# Patient Record
Sex: FEMALE | Race: WHITE | NOT HISPANIC OR LATINO | URBAN - METROPOLITAN AREA
[De-identification: names, ages, dates, MRNs, and addresses within clinical notes are randomized per-mention and may not be internally consistent; named-entity substitution may affect disease eponyms.]

---

## 2024-05-08 ENCOUNTER — INPATIENT (INPATIENT)
Facility: HOSPITAL | Age: 89
LOS: 4 days | Discharge: NURSING, REHAB OR LONG-TERM CARE | DRG: 280 | End: 2024-05-13
Attending: STUDENT IN AN ORGANIZED HEALTH CARE EDUCATION/TRAINING PROGRAM | Admitting: STUDENT IN AN ORGANIZED HEALTH CARE EDUCATION/TRAINING PROGRAM
Payer: MEDICARE

## 2024-05-08 VITALS
HEIGHT: 66 IN | SYSTOLIC BLOOD PRESSURE: 232 MMHG | OXYGEN SATURATION: 93 % | DIASTOLIC BLOOD PRESSURE: 125 MMHG | WEIGHT: 119.05 LBS | HEART RATE: 81 BPM | TEMPERATURE: 97 F | RESPIRATION RATE: 25 BRPM

## 2024-05-08 DIAGNOSIS — I16.1 HYPERTENSIVE EMERGENCY: ICD-10-CM

## 2024-05-08 LAB
ALBUMIN SERPL ELPH-MCNC: 3.1 G/DL — LOW (ref 3.3–5)
ALP SERPL-CCNC: 89 U/L — SIGNIFICANT CHANGE UP (ref 40–120)
ALT FLD-CCNC: 77 U/L — HIGH (ref 10–45)
ANION GAP SERPL CALC-SCNC: 12 MMOL/L — SIGNIFICANT CHANGE UP (ref 5–17)
AST SERPL-CCNC: 65 U/L — HIGH (ref 10–40)
BASOPHILS # BLD AUTO: 0.07 K/UL — SIGNIFICANT CHANGE UP (ref 0–0.2)
BASOPHILS NFR BLD AUTO: 0.4 % — SIGNIFICANT CHANGE UP (ref 0–2)
BILIRUB SERPL-MCNC: 0.4 MG/DL — SIGNIFICANT CHANGE UP (ref 0.2–1.2)
BUN SERPL-MCNC: 74 MG/DL — HIGH (ref 7–23)
CALCIUM SERPL-MCNC: 8.6 MG/DL — SIGNIFICANT CHANGE UP (ref 8.4–10.5)
CHLORIDE SERPL-SCNC: 101 MMOL/L — SIGNIFICANT CHANGE UP (ref 96–108)
CO2 SERPL-SCNC: 27 MMOL/L — SIGNIFICANT CHANGE UP (ref 22–31)
CREAT SERPL-MCNC: 3.46 MG/DL — HIGH (ref 0.5–1.3)
EGFR: 12 ML/MIN/1.73M2 — LOW
EOSINOPHIL # BLD AUTO: 0.2 K/UL — SIGNIFICANT CHANGE UP (ref 0–0.5)
EOSINOPHIL NFR BLD AUTO: 1.1 % — SIGNIFICANT CHANGE UP (ref 0–6)
GLUCOSE SERPL-MCNC: 172 MG/DL — HIGH (ref 70–99)
HCT VFR BLD CALC: 42.3 % — SIGNIFICANT CHANGE UP (ref 34.5–45)
HGB BLD-MCNC: 13.6 G/DL — SIGNIFICANT CHANGE UP (ref 11.5–15.5)
IMM GRANULOCYTES NFR BLD AUTO: 0.6 % — SIGNIFICANT CHANGE UP (ref 0–0.9)
LYMPHOCYTES # BLD AUTO: 1.12 K/UL — SIGNIFICANT CHANGE UP (ref 1–3.3)
LYMPHOCYTES # BLD AUTO: 6.1 % — LOW (ref 13–44)
MCHC RBC-ENTMCNC: 30.4 PG — SIGNIFICANT CHANGE UP (ref 27–34)
MCHC RBC-ENTMCNC: 32.2 GM/DL — SIGNIFICANT CHANGE UP (ref 32–36)
MCV RBC AUTO: 94.4 FL — SIGNIFICANT CHANGE UP (ref 80–100)
MONOCYTES # BLD AUTO: 0.64 K/UL — SIGNIFICANT CHANGE UP (ref 0–0.9)
MONOCYTES NFR BLD AUTO: 3.5 % — SIGNIFICANT CHANGE UP (ref 2–14)
NEUTROPHILS # BLD AUTO: 16.23 K/UL — HIGH (ref 1.8–7.4)
NEUTROPHILS NFR BLD AUTO: 88.3 % — HIGH (ref 43–77)
NRBC # BLD: 0 /100 WBCS — SIGNIFICANT CHANGE UP (ref 0–0)
NT-PROBNP SERPL-SCNC: 8464 PG/ML — HIGH (ref 0–300)
PLATELET # BLD AUTO: 240 K/UL — SIGNIFICANT CHANGE UP (ref 150–400)
POTASSIUM SERPL-MCNC: 4.2 MMOL/L — SIGNIFICANT CHANGE UP (ref 3.5–5.3)
POTASSIUM SERPL-SCNC: 4.2 MMOL/L — SIGNIFICANT CHANGE UP (ref 3.5–5.3)
PROT SERPL-MCNC: 7.1 G/DL — SIGNIFICANT CHANGE UP (ref 6–8.3)
RAPID RVP RESULT: SIGNIFICANT CHANGE UP
RBC # BLD: 4.48 M/UL — SIGNIFICANT CHANGE UP (ref 3.8–5.2)
RBC # FLD: 15.2 % — HIGH (ref 10.3–14.5)
SARS-COV-2 RNA SPEC QL NAA+PROBE: SIGNIFICANT CHANGE UP
SODIUM SERPL-SCNC: 140 MMOL/L — SIGNIFICANT CHANGE UP (ref 135–145)
TROPONIN I, HIGH SENSITIVITY RESULT: 1237.6 NG/L — HIGH
TROPONIN I, HIGH SENSITIVITY RESULT: 483 NG/L — HIGH
TROPONIN I, HIGH SENSITIVITY RESULT: 68.2 NG/L — HIGH
WBC # BLD: 18.37 K/UL — HIGH (ref 3.8–10.5)
WBC # FLD AUTO: 18.37 K/UL — HIGH (ref 3.8–10.5)

## 2024-05-08 PROCEDURE — 99223 1ST HOSP IP/OBS HIGH 75: CPT

## 2024-05-08 PROCEDURE — 93306 TTE W/DOPPLER COMPLETE: CPT | Mod: 26

## 2024-05-08 PROCEDURE — 70450 CT HEAD/BRAIN W/O DYE: CPT | Mod: 26,MC

## 2024-05-08 PROCEDURE — 71045 X-RAY EXAM CHEST 1 VIEW: CPT | Mod: 26

## 2024-05-08 PROCEDURE — 99291 CRITICAL CARE FIRST HOUR: CPT

## 2024-05-08 PROCEDURE — 99222 1ST HOSP IP/OBS MODERATE 55: CPT

## 2024-05-08 PROCEDURE — 93010 ELECTROCARDIOGRAM REPORT: CPT

## 2024-05-08 RX ORDER — FERROUS FUMARATE 350(115)MG
1 TABLET ORAL
Refills: 0 | DISCHARGE

## 2024-05-08 RX ORDER — HEPARIN SODIUM 5000 [USP'U]/ML
5000 INJECTION INTRAVENOUS; SUBCUTANEOUS EVERY 8 HOURS
Refills: 0 | Status: DISCONTINUED | OUTPATIENT
Start: 2024-05-08 | End: 2024-05-09

## 2024-05-08 RX ORDER — NIFEDIPINE 30 MG
1 TABLET, EXTENDED RELEASE 24 HR ORAL
Refills: 0 | DISCHARGE

## 2024-05-08 RX ORDER — CARVEDILOL PHOSPHATE 80 MG/1
25 CAPSULE, EXTENDED RELEASE ORAL EVERY 12 HOURS
Refills: 0 | Status: DISCONTINUED | OUTPATIENT
Start: 2024-05-08 | End: 2024-05-08

## 2024-05-08 RX ORDER — FUROSEMIDE 40 MG
40 TABLET ORAL ONCE
Refills: 0 | Status: COMPLETED | OUTPATIENT
Start: 2024-05-08 | End: 2024-05-08

## 2024-05-08 RX ORDER — SERTRALINE 25 MG/1
1 TABLET, FILM COATED ORAL
Refills: 0 | DISCHARGE

## 2024-05-08 RX ORDER — HYDRALAZINE HCL 50 MG
50 TABLET ORAL EVERY 8 HOURS
Refills: 0 | Status: DISCONTINUED | OUTPATIENT
Start: 2024-05-08 | End: 2024-05-09

## 2024-05-08 RX ORDER — NIFEDIPINE 30 MG
60 TABLET, EXTENDED RELEASE 24 HR ORAL DAILY
Refills: 0 | Status: DISCONTINUED | OUTPATIENT
Start: 2024-05-08 | End: 2024-05-13

## 2024-05-08 RX ORDER — SENNA PLUS 8.6 MG/1
1 TABLET ORAL AT BEDTIME
Refills: 0 | Status: DISCONTINUED | OUTPATIENT
Start: 2024-05-08 | End: 2024-05-13

## 2024-05-08 RX ORDER — SERTRALINE 25 MG/1
25 TABLET, FILM COATED ORAL DAILY
Refills: 0 | Status: DISCONTINUED | OUTPATIENT
Start: 2024-05-08 | End: 2024-05-13

## 2024-05-08 RX ORDER — ATORVASTATIN CALCIUM 80 MG/1
20 TABLET, FILM COATED ORAL AT BEDTIME
Refills: 0 | Status: DISCONTINUED | OUTPATIENT
Start: 2024-05-08 | End: 2024-05-11

## 2024-05-08 RX ORDER — POLYETHYLENE GLYCOL 3350 17 G/17G
17 POWDER, FOR SOLUTION ORAL DAILY
Refills: 0 | Status: DISCONTINUED | OUTPATIENT
Start: 2024-05-08 | End: 2024-05-13

## 2024-05-08 RX ORDER — LEVOTHYROXINE SODIUM 125 MCG
50 TABLET ORAL DAILY
Refills: 0 | Status: DISCONTINUED | OUTPATIENT
Start: 2024-05-08 | End: 2024-05-13

## 2024-05-08 RX ORDER — ASPIRIN/CALCIUM CARB/MAGNESIUM 324 MG
81 TABLET ORAL DAILY
Refills: 0 | Status: DISCONTINUED | OUTPATIENT
Start: 2024-05-08 | End: 2024-05-13

## 2024-05-08 RX ORDER — LEVOTHYROXINE SODIUM 125 MCG
1 TABLET ORAL
Refills: 0 | DISCHARGE

## 2024-05-08 RX ORDER — CARVEDILOL PHOSPHATE 80 MG/1
25 CAPSULE, EXTENDED RELEASE ORAL EVERY 12 HOURS
Refills: 0 | Status: DISCONTINUED | OUTPATIENT
Start: 2024-05-08 | End: 2024-05-11

## 2024-05-08 RX ORDER — NIFEDIPINE 30 MG
60 TABLET, EXTENDED RELEASE 24 HR ORAL DAILY
Refills: 0 | Status: DISCONTINUED | OUTPATIENT
Start: 2024-05-08 | End: 2024-05-08

## 2024-05-08 RX ORDER — HYDRALAZINE HCL 50 MG
10 TABLET ORAL ONCE
Refills: 0 | Status: COMPLETED | OUTPATIENT
Start: 2024-05-08 | End: 2024-05-08

## 2024-05-08 RX ORDER — ASPIRIN/CALCIUM CARB/MAGNESIUM 324 MG
1 TABLET ORAL
Refills: 0 | DISCHARGE

## 2024-05-08 RX ADMIN — Medication 1 TABLET(S): at 14:13

## 2024-05-08 RX ADMIN — Medication 60 MILLIGRAM(S): at 11:23

## 2024-05-08 RX ADMIN — Medication 81 MILLIGRAM(S): at 14:13

## 2024-05-08 RX ADMIN — SERTRALINE 25 MILLIGRAM(S): 25 TABLET, FILM COATED ORAL at 14:14

## 2024-05-08 RX ADMIN — ATORVASTATIN CALCIUM 20 MILLIGRAM(S): 80 TABLET, FILM COATED ORAL at 23:10

## 2024-05-08 RX ADMIN — POLYETHYLENE GLYCOL 3350 17 GRAM(S): 17 POWDER, FOR SOLUTION ORAL at 14:14

## 2024-05-08 RX ADMIN — Medication 10 MILLIGRAM(S): at 07:47

## 2024-05-08 RX ADMIN — SENNA PLUS 1 TABLET(S): 8.6 TABLET ORAL at 23:10

## 2024-05-08 RX ADMIN — Medication 40 MILLIGRAM(S): at 07:47

## 2024-05-08 RX ADMIN — CARVEDILOL PHOSPHATE 25 MILLIGRAM(S): 80 CAPSULE, EXTENDED RELEASE ORAL at 18:44

## 2024-05-08 RX ADMIN — HEPARIN SODIUM 5000 UNIT(S): 5000 INJECTION INTRAVENOUS; SUBCUTANEOUS at 23:10

## 2024-05-08 RX ADMIN — Medication 10 MILLIGRAM(S): at 10:58

## 2024-05-08 NOTE — H&P ADULT - NSHPLABSRESULTS_GEN_ALL_CORE
LABS:                        13.6   18.37 )-----------( 240      ( 08 May 2024 07:30 )             42.3     05-08    140  |  101  |  74<H>  ----------------------------<  172<H>  4.2   |  27  |  3.46<H>    Ca    8.6      08 May 2024 07:30    TPro  7.1  /  Alb  3.1<L>  /  TBili  0.4  /  DBili  x   /  AST  65<H>  /  ALT  77<H>  /  AlkPhos  89  05-08      Urinalysis Basic - ( 08 May 2024 07:30 )    Color: x / Appearance: x / SG: x / pH: x  Gluc: 172 mg/dL / Ketone: x  / Bili: x / Urobili: x   Blood: x / Protein: x / Nitrite: x   Leuk Esterase: x / RBC: x / WBC x   Sq Epi: x / Non Sq Epi: x / Bacteria: x       CAPILLARY BLOOD GLUCOSE            Urinalysis Basic - ( 08 May 2024 07:30 )    Color: x / Appearance: x / SG: x / pH: x  Gluc: 172 mg/dL / Ketone: x  / Bili: x / Urobili: x   Blood: x / Protein: x / Nitrite: x   Leuk Esterase: x / RBC: x / WBC x   Sq Epi: x / Non Sq Epi: x / Bacteria: x        RADIOLOGY & ADDITIONAL TESTS:  < from: CT Head No Cont (05.08.24 @ 08:27) >    FINDINGS:  There is moderate diffuse parenchymal volume loss.    There are areas of low attenuation in the periventricular white matter   likely related to mild chronic microvascular ischemic changes.    Chronic infarct right corona radiata/basal ganglia. Tiny chronic infarct   left cerebellum.    There is no acute intracranial hemorrhage, parenchymal mass, mass effect   or midline shift. There is no acute territorial infarct. There is no   hydrocephalus.    The cranium is intact. Small retention cyst/polyp left maxillary sinus        IMPRESSION:  No acute intracranial hemorrhage or acute territorial infarct.  If   symptoms persist, follow-up MRI exam recommended.    --- End of Report ---    < end of copied text >        Consultant(s) Notes Reviewed:  [x ] YES  [ ] NO  Care Discussed with Consultants/Other Providers [ x] YES  [ ] NO  Imaging Personally Reviewed:  [x ] YES  [ ] NO LABS:                        13.6   18.37 )-----------( 240      ( 08 May 2024 07:30 )             42.3     05-08    140  |  101  |  74<H>  ----------------------------<  172<H>  4.2   |  27  |  3.46<H>    Ca    8.6      08 May 2024 07:30    TPro  7.1  /  Alb  3.1<L>  /  TBili  0.4  /  DBili  x   /  AST  65<H>  /  ALT  77<H>  /  AlkPhos  89  05-08      Urinalysis Basic - ( 08 May 2024 07:30 )    Color: x / Appearance: x / SG: x / pH: x  Gluc: 172 mg/dL / Ketone: x  / Bili: x / Urobili: x   Blood: x / Protein: x / Nitrite: x   Leuk Esterase: x / RBC: x / WBC x   Sq Epi: x / Non Sq Epi: x / Bacteria: x      CAPILLARY BLOOD GLUCOSE      Urinalysis Basic - ( 08 May 2024 07:30 )    Color: x / Appearance: x / SG: x / pH: x  Gluc: 172 mg/dL / Ketone: x  / Bili: x / Urobili: x   Blood: x / Protein: x / Nitrite: x   Leuk Esterase: x / RBC: x / WBC x   Sq Epi: x / Non Sq Epi: x / Bacteria: x        RADIOLOGY & ADDITIONAL TESTS:  < from: CT Head No Cont (05.08.24 @ 08:27) >    FINDINGS:  There is moderate diffuse parenchymal volume loss.    There are areas of low attenuation in the periventricular white matter   likely related to mild chronic microvascular ischemic changes.    Chronic infarct right corona radiata/basal ganglia. Tiny chronic infarct   left cerebellum.    There is no acute intracranial hemorrhage, parenchymal mass, mass effect   or midline shift. There is no acute territorial infarct. There is no   hydrocephalus.    The cranium is intact. Small retention cyst/polyp left maxillary sinus        IMPRESSION:  No acute intracranial hemorrhage or acute territorial infarct.  If   symptoms persist, follow-up MRI exam recommended.    --- End of Report ---    < end of copied text >        Consultant(s) Notes Reviewed:  [x ] YES  [ ] NO  Care Discussed with Consultants/Other Providers [ x] YES  [ ] NO  Imaging Personally Reviewed:  [x ] YES  [ ] NO

## 2024-05-08 NOTE — H&P ADULT - HISTORY OF PRESENT ILLNESS
89 y/o F with PMH ESRD on HD Tu/Th/Sat, chronic diastolic CHF, HTN, anemia of chronic disease, HLD, hypothyroidism, depression, anxiety, hx of VTE, severe protein calorie malnutrition,  BIBEMS from [facility] for AMS, SOB, and HTN. On initial eval pt noted with tachypnea, diaphoresis, awake but not responsive, /125, HR 81, RR 25, O2 93% on RA, s/p lasix 40mg IVP x1, hydralazine 10mg IVP x1 with SBP improved to 168, mentation now baseline, AAOx3. Patient endorses headache, nausea, no emesis, some SOB now improved. Denies change in vision, cp, palpitation, cough, diarrhea, constipation, abd pain.

## 2024-05-08 NOTE — CONSULT NOTE ADULT - SUBJECTIVE AND OBJECTIVE BOX
ALICE PEREA  [id number]      HPI:  89 y/o F with PMH ESRD on HD Tu/Th/Sat, chronic diastolic CHF, HTN, anemia of chronic disease, HLD, hypothyroidism, depression, anxiety, hx of VTE, severe protein calorie malnutrition,  BIBEMS from [facility] for AMS, SOB, and HTN. On initial eval pt noted with tachypnea, diaphoresis, awake but not responsive, /125, HR 81, RR 25, O2 93% on RA, s/p lasix 40mg IVP x1, hydralazine 10mg IVP x1 with SBP improved to 168, mentation now baseline, AAOx3. Patient endorses headache, nausea, no emesis, some SOB now improved. Denies change in vision, cp, palpitation, cough, diarrhea, constipation, abd pain.   (08 May 2024 08:28)        ALLERGIES:  No Known Allergies      PAST MEDICAL & SURGICAL HISTORY:  ESRD on dialysis      HTN (hypertension)      HLD (hyperlipidemia)      Hypothyroidism      Anxiety and depression            CURRENT MEDICATIONS:  MEDICATIONS  (STANDING):  aspirin  chewable 81 milliGRAM(s) Oral daily  atorvastatin 20 milliGRAM(s) Oral at bedtime  carvedilol 25 milliGRAM(s) Oral every 12 hours  heparin   Injectable 5000 Unit(s) SubCutaneous every 8 hours  hydrALAZINE 50 milliGRAM(s) Oral every 8 hours  levothyroxine 50 MICROGram(s) Oral daily  Nephro-luis 1 Tablet(s) Oral daily  NIFEdipine XL 60 milliGRAM(s) Oral daily  polyethylene glycol 3350 17 Gram(s) Oral daily  senna 1 Tablet(s) Oral at bedtime  sertraline 25 milliGRAM(s) Oral daily    MEDICATIONS  (PRN):      SOCIAL HISTORY:  denies    FAMILY HISTORY:  HTN    ROS:  All 10 systems reviewed and positives noted in HPI    OBJECTIVE:    VITAL SIGNS:  Vital Signs Last 24 Hrs  T(C): 36.7 (08 May 2024 07:31), Max: 36.7 (08 May 2024 07:31)  T(F): 98.1 (08 May 2024 07:31), Max: 98.1 (08 May 2024 07:31)  HR: 70 (08 May 2024 09:00) (70 - 81)  BP: 156/67 (08 May 2024 09:00) (156/67 - 232/125)  BP(mean): 93 (08 May 2024 09:00) (88 - 135)  RR: 20 (08 May 2024 09:00) (20 - 30)  SpO2: 96% (08 May 2024 09:00) (93% - 100%)    Parameters below as of 08 May 2024 09:00  Patient On (Oxygen Delivery Method): room air        PHYSICAL EXAM:  General:  no distress  HEENT: sclera anicteric  Neck: supple, no carotid bruits b/l  CVS: JVP ~ 7 cm H20, RRR, s1, s2, no murmurs/rubs/gallops  Chest: unlabored respirations, clear to auscultation anteriorly  Abdomen: non-distended  Extremities: no lower extremity edema b/l  Neuro: awake, alert & oriented x 3      LABS:                        13.6   18.37 )-----------( 240      ( 08 May 2024 07:30 )             42.3     05-08    140  |  101  |  74<H>  ----------------------------<  172<H>  4.2   |  27  |  3.46<H>    Ca    8.6      08 May 2024 07:30    TPro  7.1  /  Alb  3.1<L>  /  TBili  0.4  /  DBili  x   /  AST  65<H>  /  ALT  77<H>  /  AlkPhos  89  05-08        ECG: Sinus rhythm      TTE: < from: TTE Echo Complete w/o Contrast w/ Doppler (05.08.24 @ 10:37) >   1. Mildly decreased global left ventricular systolic function.   2. Left ventricular ejection fraction, by visual estimation, is 45 to   50%.   3. Mild global left ventricle hypokinesis with regional variation; the   inferoseptal wall and anteroseptal wall appear severely   hypokinetic/akinetic. False tendon visualized.   4. Moderately increased LV wall thickness.   5. Normal left ventricular internal cavity size.   6. Normal right ventricular size and function.   7. The left atrium is normal in size.   8. The right atrium is normal in size.   9. Mild mitral annular calcification.  10. Mild thickening and calcification of the anterior and posterior   mitral valve leaflets.  11. Mild mitral valve regurgitation.  12. Mild tricuspid regurgitation.  13. Aortic valve leaflet calcification with restricted leaflet opening.   Mild aortic valve stenosis (peak velocity 1.5 m/s, mean gradient 5 mmHg,   calculated LILLY by continuity equation 1.6 cm^2, DI 0.5).  14. Mild pulmonic valve regurgitation.  15. Estimated pulmonary artery systolic pressure is 57.8 mmHg assuming a   right atrial pressure of 15 mmHg, which is consistent with moderate   pulmonary hypertension.  16. Small pericardial effusion.  17. Subcostal window not well visualized.       ALICE PEREA  [id number]      HPI:    89 y/o F with PMH ESRD on HD Tu/Th/Sat, chronic diastolic CHF, HTN, anemia of chronic disease, HLD, hypothyroidism, depression, anxiety, hx of VTE, severe protein calorie malnutrition,  BIBEMS from [facility] for altered mental status, shortness of breath and hypertension.    On initial evaluation per chart notes, the patient had tachypnea, diaphoresis, awake but not responsive.    ALLERGIES:  No Known Allergies      CURRENT MEDICATIONS:  MEDICATIONS  (STANDING):  aspirin  chewable 81 milliGRAM(s) Oral daily  atorvastatin 20 milliGRAM(s) Oral at bedtime  carvedilol 25 milliGRAM(s) Oral every 12 hours  heparin   Injectable 5000 Unit(s) SubCutaneous every 8 hours  hydrALAZINE 50 milliGRAM(s) Oral every 8 hours  levothyroxine 50 MICROGram(s) Oral daily  Nephro-luis 1 Tablet(s) Oral daily  NIFEdipine XL 60 milliGRAM(s) Oral daily  polyethylene glycol 3350 17 Gram(s) Oral daily  senna 1 Tablet(s) Oral at bedtime  sertraline 25 milliGRAM(s) Oral daily      ROS:  All 10 systems reviewed and positives noted in HPI    OBJECTIVE:    VITAL SIGNS:  Vital Signs Last 24 Hrs  T(C): 36.7 (08 May 2024 07:31), Max: 36.7 (08 May 2024 07:31)  T(F): 98.1 (08 May 2024 07:31), Max: 98.1 (08 May 2024 07:31)  HR: 70 (08 May 2024 09:00) (70 - 81)  BP: 156/67 (08 May 2024 09:00) (156/67 - 232/125)  BP(mean): 93 (08 May 2024 09:00) (88 - 135)  RR: 20 (08 May 2024 09:00) (20 - 30)  SpO2: 96% (08 May 2024 09:00) (93% - 100%)    Parameters below as of 08 May 2024 09:00  Patient On (Oxygen Delivery Method): room air      PHYSICAL EXAM:  General:  no distress  HEENT: sclera anicteric  Neck: supple  CVS: JVP ~ 7 cm H20, RRR, s1, s2, no murmurs/rubs/gallops  Chest: unlabored respirations, clear to auscultation anteriorly  Abdomen: non-distended  Extremities: no lower extremity edema b/l  Neuro: awake, alert & oriented       LABS:                        13.6   18.37 )-----------( 240      ( 08 May 2024 07:30 )             42.3     05-08    140  |  101  |  74<H>  ----------------------------<  172<H>  4.2   |  27  |  3.46<H>    Ca    8.6      08 May 2024 07:30    TPro  7.1  /  Alb  3.1<L>  /  TBili  0.4  /  DBili  x   /  AST  65<H>  /  ALT  77<H>  /  AlkPhos  89  05-08        ECG: Sinus rhythm, PAC      TTE: < from: TTE Echo Complete w/o Contrast w/ Doppler (05.08.24 @ 10:37) >   1. Mildly decreased global left ventricular systolic function.   2. Left ventricular ejection fraction, by visual estimation, is 45 to 50%.   3. Mild global left ventricle hypokinesis with regional variation; the   inferoseptal wall and anteroseptal wall appear severely   hypokinetic/akinetic. False tendon visualized.   4. Moderately increased LV wall thickness.   5. Normal left ventricular internal cavity size.   6. Normal right ventricular size and function.   7. The left atrium is normal in size.   8. The right atrium is normal in size.   9. Mild mitral annular calcification.  10. Mild thickening and calcification of the anterior and posterior   mitral valve leaflets.  11. Mild mitral valve regurgitation.  12. Mild tricuspid regurgitation.  13. Aortic valve leaflet calcification with restricted leaflet opening.   Mild aortic valve stenosis (peak velocity 1.5 m/s, mean gradient 5 mmHg,   calculated LILLY by continuity equation 1.6 cm^2, DI 0.5).  14. Mild pulmonic valve regurgitation.  15. Estimated pulmonary artery systolic pressure is 57.8 mmHg assuming a   right atrial pressure of 15 mmHg, which is consistent with moderate   pulmonary hypertension.  16. Small pericardial effusion.  17. Subcostal window not well visualized.

## 2024-05-08 NOTE — ED PROVIDER NOTE - CLINICAL SUMMARY MEDICAL DECISION MAKING FREE TEXT BOX
Brought in by [facility] for short of breath today, lab, CT, meds, EKG, nephrology consult, admission

## 2024-05-08 NOTE — ED ADULT TRIAGE NOTE - SOURCE OF INFORMATION
Les Rangel is here for Follow up evaluation of potential COVID-19 exposure.      Tm has completed > 10 days post sx onset. Still sx with minimal improvement of respiratory Sx. Does not meet RTW criteria, Referrred to PCP. Told we will need PCP clearance before RTW. TM has appt on 10/19. Will follow up at that time to assess RTW clearance.           You are to remain off work and out of public places except to seek medical care, and complete the symptom tracker daily on [facility].  YOU MAY NOT RETURN TO WORK WITHOUT CLEARANCE FROM [employer].                    
EMS

## 2024-05-08 NOTE — ED ADULT TRIAGE NOTE - CHIEF COMPLAINT QUOTE
BIB EMS from [facility] for HTN and SOB. Unknown baseline mental status, pt nonverbal in triage. Arrives hypertensive, diaphoretic, and tachypneic.

## 2024-05-08 NOTE — H&P ADULT - NSHPPHYSICALEXAM_GEN_ALL_CORE
T(C): 36.7 (05-08-24 @ 07:31), Max: 36.7 (05-08-24 @ 07:31)  HR: 70 (05-08-24 @ 09:00) (70 - 81)  BP: 156/67 (05-08-24 @ 09:00) (156/67 - 232/125)  RR: 20 (05-08-24 @ 09:00) (20 - 30)  SpO2: 96% (05-08-24 @ 09:00) (93% - 100%)  Wt(kg): --Vital Signs Last 24 Hrs  T(C): 36.7 (08 May 2024 07:31), Max: 36.7 (08 May 2024 07:31)  T(F): 98.1 (08 May 2024 07:31), Max: 98.1 (08 May 2024 07:31)  HR: 70 (08 May 2024 09:00) (70 - 81)  BP: 156/67 (08 May 2024 09:00) (156/67 - 232/125)  BP(mean): 93 (08 May 2024 09:00) (88 - 135)  RR: 20 (08 May 2024 09:00) (20 - 30)  SpO2: 96% (08 May 2024 09:00) (93% - 100%)    Parameters below as of 08 May 2024 09:00  Patient On (Oxygen Delivery Method): room air        PHYSICAL EXAM:  GENERAL: NAD, well-groomed, well-developed, elderly female, pleasant  HEAD:  Atraumatic, Normocephalic  EYES: EOMI, PERRLA, conjunctiva and sclera clear  ENMT: No tonsillar erythema, exudates, or enlargement; Moist mucous membranes, Good dentition, No lesions  NECK: Supple, No JVD, Normal thyroid  NERVOUS SYSTEM:  Alert & Oriented X3, fair concentration; Motor Strength 4/5 B/L upper and lower extremities  CHEST/LUNG: Clear to percussion bilaterally; No rales, rhonchi, wheezing, or rubs. +diminished b/l. +right chest permacath  HEART: Regular rate and rhythm; No murmurs, rubs, or gallops  ABDOMEN: Soft, Nontender, Nondistended; Bowel sounds present  EXTREMITIES:  2+ Peripheral Pulses, No clubbing, cyanosis, or edema  LYMPH: No lymphadenopathy noted  SKIN: No rashes or lesions

## 2024-05-08 NOTE — ED ADULT NURSE NOTE - OBJECTIVE STATEMENT
Patient BIB EMS from [facility] with AMS & SOB. Patient hypertensive. Fluid overload suspected from lung sounds. Patient is dialysis patient. Patient is not verbally responsive at this time, opens eyes to voice or tactile stimulation.

## 2024-05-08 NOTE — H&P ADULT - NSHPREVIEWOFSYSTEMS_GEN_ALL_CORE
REVIEW OF SYSTEMS:  CONSTITUTIONAL: No fever, weight loss. +fatigue  EYES: No eye pain, visual disturbances, or discharge  ENMT:  No difficulty hearing, tinnitus, vertigo; No sinus or throat pain  NECK: No pain or stiffness  RESPIRATORY: No cough, wheezing, chills or hemoptysis; +SOB, TOBAR  CARDIOVASCULAR: No chest pain, palpitations, dizziness, or leg swelling  GASTROINTESTINAL: No abdominal or epigastric pain. No vomiting, or hematemesis; No diarrhea or constipation. No melena or hematochezia. +nausea  GENITOURINARY: No dysuria, frequency, hematuria, or incontinence  NEUROLOGICAL: No headaches, memory loss, loss of strength, numbness, or tremors  SKIN: No itching, burning, rashes, or lesions   ENDOCRINE: No heat or cold intolerance; No hair loss  MUSCULOSKELETAL: No joint pain or swelling; No muscle, back, or extremity pain  HEME/LYMPH: No easy bruising, or bleeding gums  ALLERGY AND IMMUNOLOGIC: No hives or eczema    ALL ROS REVIEWED AND NORMAL EXCEPT AS STATED ABOVE

## 2024-05-08 NOTE — ED PROVIDER NOTE - HIV OFFER
Unable to answer due to medical condition/unresponsive/etc... No respiratory distress. No stridor, Lungs sounds clear with good aeration bilaterally.

## 2024-05-08 NOTE — H&P ADULT - CONVERSATION DETAILS
Diagnosis, prognosis, and treatment plan reviewed with HCP son Corby Smith . He verbalizes understanding. GOC/Advanced directives reviewed - patient is DNR/DNI - son to bring in prior M[facility]ST

## 2024-05-08 NOTE — ED PROVIDER NOTE - OBJECTIVE STATEMENT
89-year-old female with history of CHF, anxiety, CKD, depression, high blood pressure, venous thrombosis presents with short of breath today.  Patient is a transfer from [facility] for short of breath and high blood pressure.  Patient is awake but not responsive with tachypnea brought in by clinical of EMS.  As per transfer form, patient is DNR/DNI.  Patient is currently on baby aspirin, atorvastatin, Coreg, iron gluconate, hydralazine, levothyroxine, Nephro-Jocelyn, nifedipine ER 60 mg, sertraline, sodium bicarb and acid.

## 2024-05-08 NOTE — ED ADULT NURSE NOTE - CHIEF COMPLAINT QUOTE
BIB EMS from [facility] for HTN and SOB. Unknown baseline mental status, pt nonverbal in triage. Arrives hypertensive, diaphoretic, and tachypneic.

## 2024-05-08 NOTE — CONSULT NOTE ADULT - ASSESSMENT
Assessment 89-year-old female history of ESRD on HD Tuesday, Thursday, Saturday, chronic diastolic CHF, HTN, anemia, HLD hypothyroidism and VTE admitted for shortness of breath and hypertension.  Patient states this morning after getting back from the bathroom she felt short of breath.  Was hypertensive upon arrival.  Denies any chest pain, palpitations.  Patient reports improvement now and states that she has been compliant with her dialysis    Recommendations  EKG sinus rhythm no acute ischemia  trop elevating; trend to peak with serial EKGS  TTE with EF 45-50%, mild global LV hypokinesis, inferorseptal wall and anteroseptal wall severely akinetic, mod LVH, mild AS and mod pulm ht>> awaiting callback from [facility] cardiologist Dr Lai for records  Continue current CV meds, pt may not be a candidate for ischemic eval 2/2 CKD Assessment:  89-year-old female with history of ESRD on HD Tuesday, Thursday, Saturday, chronic diastolic CHF, HTN, anemia, HLD hypothyroidism and VTE admitted for shortness of breath and hypertension.  Patient states this morning after getting back from the bathroom she felt short of breath.  Was hypertensive upon arrival.  Denies any chest pain, palpitations.  Patient reports improvement now and states that she has been compliant with her dialysis    Recommendations  EKG sinus rhythm no acute ischemia  trop elevating; trend to peak with serial EKGS  TTE with EF 45-50%, mild global LV hypokinesis, inferoseptal wall and anteroseptal wall severely hypokinetic/ akinetic, mod LVH, mild AS and mod pulm htn>> awaiting callback from [facility] cardiologist Dr Lai for records  Continue current CV meds, patient may not be a candidate for ischemic evaluation given advanced age of 89 years and overall risk for complications

## 2024-05-08 NOTE — ED ADULT NURSE NOTE - NSFALLHARMRISKINTERV_ED_ALL_ED
Assistance OOB with selected safe patient handling equipment if applicable/Assistance with ambulation/Communicate risk of Fall with Harm to all staff, patient, and family/Monitor gait and stability/Monitor for mental status changes and reorient to person, place, and time, as needed/Provide visual cue: red socks, yellow wristband, yellow gown, etc/Reinforce activity limits and safety measures with patient and family/Toileting schedule using arm’s reach rule for commode and bathroom/Use of alarms - bed, stretcher, chair and/or video monitoring/Bed in lowest position, wheels locked, appropriate side rails in place/Call bell, personal items and telephone in reach/Instruct patient to call for assistance before getting out of bed/chair/stretcher/Non-slip footwear applied when patient is off stretcher/[place] to call system/Physically safe environment - no spills, clutter or unnecessary equipment/Purposeful Proactive Rounding/Room/bathroom lighting operational, light cord in reach

## 2024-05-08 NOTE — CONSULT NOTE ADULT - NS ATTEND AMEND GEN_ALL_CORE FT
I have seen and examined the patient. I have discussed case with MONTSE Saba.    Neena Morton is a 89 year old woman with past medical history of End stage renal disease (on hemodialysis), Hypertension and Hyperlipidemia who was brought in by EMS from living facility due to altered mental status and dyspnea, found to have hypertensive emergency and elevated troponins.    ECG consistent with sinus rhythm and nonspecific ST abnormalities. Troponins elevated and rising, which may be secondary to hypertensive emergency (SBP 230s) and end stage renal disease. Echo consistent with LVEF 45-50%, inferoseptal and anteroseptal wall akinesis, moderate pulmonary hypertension.    The patient denies angina, reports breathing has improved. Overall, recommend to continue to trend troponin until it peaks with serial ECGs, continue to monitor on telemetry for arrhythmias, will follow up records from patient's [place] cardiologist, Dr. Lai to evaluate if HFmrEF with LV wall motion abnormalities is a new finding, overall suspect medical management due to advanced age, end stage renal disease and risk of complications with coronary angiogram. Discussed with patient's son who agrees with plan. In regards to HFmrEF, there is no clear indication for ARNI in this situation and SGLT2i and/or MRA may not be able to be tolerated due to end stage renal disease. Consider additional Hydralazine for BP control. Dialysis per Renal for fluid removal.    Will sign out this case to cardiologist to follow along tomorrow.    Kamaljit Leung MD  Cardiology

## 2024-05-08 NOTE — H&P ADULT - ASSESSMENT
89 y/o F with PMH ESRD on HD Tu/Th/Sat, chronic diastolic CHF, HTN, anemia of chronic disease, HLD, hypothyroidism, depression, anxiety, hx of VTE, severe protein calorie malnutrition,  [facility]S from [facility] for [facility], SOB, and HTN admitted for AMS, hypertensive emergency, and SOB r/o acute chf exacerbation.    AMS - improving  Hypertensive emergency  Acute on chronic diastolic CHF exacerbation vs fluid overload 2/2 ESRD on HD  -Admit to telemetry  -EKG reviewed - sinus with PVCs , HR 83, qtc 481  -Trop 68.2, follow serial trop/ecg  -CTH unremarkable for acute pathology  -Cardiac monitoring  -Continue supplemental oxygen with nasal canula to maintain SpO2 > 92%  -Continuous pulse ox  -RVP PCR, CXR  -Follow echo    Transaminitis     ESRD on HD Tu/Th/Sat  -Nephro consulted in ED - Dr. Malcolm Jones q8h  Diet: DASH  Activity: Bedrest   PT/OT as tolerated  DVT ppx: Lovenox  GI ppx: No indication for GI prophylaxis  Standard precautions: Aspiration, fall, safety, seizure, skin  Code Status  HCP - son updated ***   89 y/o F with PMH ESRD on HD Tu/Th/Sat, chronic diastolic CHF, HTN, anemia of chronic disease, HLD, hypothyroidism, depression, anxiety, hx of VTE, severe protein calorie malnutrition,  BIBEMS from [facility] for AMS, SOB, and HTN admitted for AMS, hypertensive emergency, and SOB r/o acute CHF exacerbation.    AMS - improving  Hypertensive emergency  Acute on chronic diastolic CHF exacerbation vs fluid overload 2/2 ESRD on HD  -Admit to telemetry  -Cardiac monitoring  -EKG reviewed - sinus with PVCs , HR 83, qtc 481  -Trop 68.2, follow serial trop/ecg  -CTH unremarkable for acute pathology  -Continue supplemental oxygen with nasal canula to maintain SpO2 > 92%  -Continuous pulse ox  -RVP PCR, CXR pending  -Follow echo  -Continue coreg 25mg q12h, hydralazine 50mg q8h, nifedipine 60mg daily     Transaminitis   -Monitor, avoid hepatotoxins    ESRD on HD Tu/Th/Sat  Anemia of chronic disease  -Nephro consulted in ED   -HD per nephro  -Monitor CBC, BMP    Hypothyroidism   -Continue synthroid    HLD  -Continue lipitor 20mg qhs    Vitals q8h  Diet: renal  Activity: Bedrest   PT/OT as tolerated  DVT ppx: HSQ  Standard precautions: Aspiration, fall, safety, seizure, skin  Code Status - DNR/DNI  HCP - son updated   89 y/o F with PMH ESRD on HD Tu/Th/Sat, chronic diastolic CHF, HTN, anemia of chronic disease, HLD, hypothyroidism, depression, anxiety, hx of VTE, severe protein calorie malnutrition,  BIBEMS from [facility] for AMS, SOB, and HTN admitted for AMS, hypertensive emergency, and SOB r/o acute CHF exacerbation.    AMS - improving  Hypertensive emergency  Acute on chronic diastolic CHF exacerbation vs fluid overload 2/2 ESRD on HD  -Admit to telemetry  -Cardiac monitoring  -EKG reviewed - sinus with PVCs , HR 83, qtc 481  -Trop 68.2, follow serial trop/ecg  -CTH unremarkable for acute pathology  -Continue supplemental oxygen with nasal canula to maintain SpO2 > 92%  -Continuous pulse ox  -RVP PCR, CXR pending  -Follow echo  -Continue coreg 25mg q12h, hydralazine 50mg q8h, nifedipine 60mg daily     Transaminitis   -Monitor, avoid hepatotoxins    ESRD on HD Tu/Th/Sat  Anemia of chronic disease  -Nephro consulted in ED   -HD per nephro  -Monitor CBC, BMP    Hypothyroidism   -Continue synthroid    HLD  -Continue lipitor 20mg qhs    Vitals q8h  Diet: Renal  Activity: Bedrest   PT/OT as tolerated  DVT ppx: HSQ  Standard precautions: Aspiration, fall, safety, seizure, skin  Code Status - DNR/DNI - son to bring MOLST  HCP - son Corby Smith

## 2024-05-08 NOTE — H&P ADULT - NSICDXPASTMEDICALHX_GEN_ALL_CORE_FT
PAST MEDICAL HISTORY:  Anxiety and depression     ESRD on dialysis     HLD (hyperlipidemia)     HTN (hypertension)     Hypothyroidism

## 2024-05-08 NOTE — ED PROVIDER NOTE - PROGRESS NOTE DETAILS
Case discussed with Dr. Fowler nephrologist.  Case discussed with Dr. Alvarado primary care doctor. Case discussed with Dr. Garcia for admission.

## 2024-05-08 NOTE — ED PROVIDER NOTE - CARE PLAN
[facility] Discharge DX:	Hypertensive emergency  Secondary Diagnosis:	Fluid overload

## 2024-05-08 NOTE — CONSULT NOTE ADULT - SUBJECTIVE AND OBJECTIVE BOX
NEPHROLOGY CONSULTATION    CHIEF COMPLAINT: SOB    HPI:  Pt is 89 yo F with PMH ESRD on HD TTS, chronic diastolic CHF, HTN, anemia of chronic disease, HLD, hypothyroidism, depression, anxiety, hx of VTE, severe protein calorie malnutrition, B[facility]EMS from [facility] for AMS, SOB, and HTN. On initial eval pt noted with tachypnea, diaphoresis. C/o headache, nausea, no emesis, some SOB, now improved. No cp, palpitation, cough, diarrhea, constipation, abd pain.      ROS:  as above    Allergies:  No Known Allergies    PAST MEDICAL & SURGICAL HISTORY:  as above    SOCIAL HISTORY:  negative    FAMILY HISTORY:  NC    MEDICATIONS  (STANDING):  aspirin  chewable 81 milliGRAM(s) Oral daily  atorvastatin 20 milliGRAM(s) Oral at bedtime  carvedilol 25 milliGRAM(s) Oral every 12 hours  heparin   Injectable 5000 Unit(s) SubCutaneous every 8 hours  hydrALAZINE 50 milliGRAM(s) Oral every 8 hours  levothyroxine 50 MICROGram(s) Oral daily  Nephro-luis 1 Tablet(s) Oral daily  NIFEdipine XL 60 milliGRAM(s) Oral daily  polyethylene glycol 3350 17 Gram(s) Oral daily  senna 1 Tablet(s) Oral at bedtime  sertraline 25 milliGRAM(s) Oral daily    Home Medications:  aspirin 81 mg oral tablet, chewable: 1 tab(s) chewed once a day (08 May 2024 09:35)  carvedilol 25 mg oral tablet: 1 tab(s) orally every 12 hours (08 May 2024 09:36)  Ferretts Iron 325 mg (106 mg elemental iron) oral tablet: 1 tab(s) orally once a day (08 May 2024 09:36)  hydrALAZINE 50 mg oral tablet: 1 tab(s) orally every 8 hours (08 May 2024 09:37)  Lipitor 20 mg oral tablet: 1 tab(s) orally once a day (at bedtime) (08 May 2024 09:35)  Nephro-Luis oral tablet: 1 tab(s) orally once a day (08 May 2024 09:38)  NIFEdipine 60 mg oral tablet, extended release: 1 tab(s) orally once a day (08 May 2024 09:38)  sertraline 25 mg oral tablet: 1 tab(s) orally once a day (08 May 2024 09:39)  Synthroid 50 mcg (0.05 mg) oral tablet: 1 tab(s) orally once a day (08 May 2024 09:37)    Vital Signs Last 24 Hrs  T(C): 36.7 (05-08-24 @ 07:31), Max: 36.7 (05-08-24 @ 07:31)  T(F): 98.1 (05-08-24 @ 07:31), Max: 98.1 (05-08-24 @ 07:31)  HR: 70 (05-08-24 @ 09:00) (70 - 81)  BP: 156/67 (05-08-24 @ 09:00) (156/67 - 232/125)  BP(mean): 93 (05-08-24 @ 09:00) (88 - 135)  RR: 20 (05-08-24 @ 09:00) (20 - 30)  SpO2: 96% (05-08-24 @ 09:00) (93% - 100%)    LABS:                        13.6   18.37 )-----------( 240      ( 08 May 2024 07:30 )             42.3     05-08    140  |  101  |  74<H>  ----------------------------<  172<H>  4.2   |  27  |  3.46<H>    Ca    8.6      08 May 2024 07:30    TPro  7.1  /  Alb  3.1<L>  /  TBili  0.4  /  DBili  x   /  AST  65<H>  /  ALT  77<H>  /  AlkPhos  89  05-08    LIVER FUNCTIONS - ( 08 May 2024 07:30 )  Alb: 3.1 g/dL / Pro: 7.1 g/dL / ALK PHOS: 89 U/L / ALT: 77 U/L / AST: 65 U/L / GGT: x           A/P:    full consult to follow    [phone]       NEPHROLOGY CONSULTATION    CHIEF COMPLAINT: SOB    HPI:  Pt is 89 yo F with PMH ESRD on HD TTS, chronic diastolic CHF, HTN, anemia of chronic disease, HLD, hypothyroidism, depression, anxiety, hx of VTE, severe protein calorie malnutrition, B[facility]EMS from [facility] for AMS, SOB, and HTN. S/p N/V x 1, some SOB, now improved. No cp, palpitation, cough, diarrhea, constipation, abd pain, F/C. Seen in ED, on NC O2.    ROS:  as above    Allergies:  No Known Allergies    PAST MEDICAL & SURGICAL HISTORY:  as above    SOCIAL HISTORY:  negative    FAMILY HISTORY:  NC    MEDICATIONS  (STANDING):  aspirin  chewable 81 milliGRAM(s) Oral daily  atorvastatin 20 milliGRAM(s) Oral at bedtime  carvedilol 25 milliGRAM(s) Oral every 12 hours  heparin   Injectable 5000 Unit(s) SubCutaneous every 8 hours  hydrALAZINE 50 milliGRAM(s) Oral every 8 hours  levothyroxine 50 MICROGram(s) Oral daily  Nephro-luis 1 Tablet(s) Oral daily  NIFEdipine XL 60 milliGRAM(s) Oral daily  polyethylene glycol 3350 17 Gram(s) Oral daily  senna 1 Tablet(s) Oral at bedtime  sertraline 25 milliGRAM(s) Oral daily    Home Medications:  aspirin 81 mg oral tablet, chewable: 1 tab(s) chewed once a day (08 May 2024 09:35)  carvedilol 25 mg oral tablet: 1 tab(s) orally every 12 hours (08 May 2024 09:36)  Ferretts Iron 325 mg (106 mg elemental iron) oral tablet: 1 tab(s) orally once a day (08 May 2024 09:36)  hydrALAZINE 50 mg oral tablet: 1 tab(s) orally every 8 hours (08 May 2024 09:37)  Lipitor 20 mg oral tablet: 1 tab(s) orally once a day (at bedtime) (08 May 2024 09:35)  Nephro-Luis oral tablet: 1 tab(s) orally once a day (08 May 2024 09:38)  NIFEdipine 60 mg oral tablet, extended release: 1 tab(s) orally once a day (08 May 2024 09:38)  sertraline 25 mg oral tablet: 1 tab(s) orally once a day (08 May 2024 09:39)  Synthroid 50 mcg (0.05 mg) oral tablet: 1 tab(s) orally once a day (08 May 2024 09:37)    Vital Signs Last 24 Hrs  T(C): 36.7 (05-08-24 @ 07:31), Max: 36.7 (05-08-24 @ 07:31)  T(F): 98.1 (05-08-24 @ 07:31), Max: 98.1 (05-08-24 @ 07:31)  HR: 70 (05-08-24 @ 09:00) (70 - 81)  BP: 156/67 (05-08-24 @ 09:00) (156/67 - 232/125)  BP(mean): 93 (05-08-24 @ 09:00) (88 - 135)  RR: 20 (05-08-24 @ 09:00) (20 - 30)  SpO2: 96% (05-08-24 @ 09:00) (93% - 100%)    s1s2  b/l air entry  soft, ND  no edema     LABS:                        13.6   18.37 )-----------( 240      ( 08 May 2024 07:30 )             42.3     05-08    140  |  101  |  74<H>  ----------------------------<  172<H>  4.2   |  27  |  3.46<H>    Ca    8.6      08 May 2024 07:30    TPro  7.1  /  Alb  3.1<L>  /  TBili  0.4  /  DBili  x   /  AST  65<H>  /  ALT  77<H>  /  AlkPhos  89  05-08    LIVER FUNCTIONS - ( 08 May 2024 07:30 )  Alb: 3.1 g/dL / Pro: 7.1 g/dL / ALK PHOS: 89 U/L / ALT: 77 U/L / AST: 65 U/L / GGT: x           A/P:    ESRD on HD   Adm w/CHF  HD today  TMP as able  Will eval for HD needs daily  Renal diet   D/w family at bedside    [phone]

## 2024-05-09 LAB
ALBUMIN SERPL ELPH-MCNC: 2.8 G/DL — LOW (ref 3.3–5)
ALP SERPL-CCNC: 74 U/L — SIGNIFICANT CHANGE UP (ref 40–120)
ALT FLD-CCNC: 45 U/L — SIGNIFICANT CHANGE UP (ref 10–45)
ANION GAP SERPL CALC-SCNC: 9 MMOL/L — SIGNIFICANT CHANGE UP (ref 5–17)
AST SERPL-CCNC: 30 U/L — SIGNIFICANT CHANGE UP (ref 10–40)
BILIRUB SERPL-MCNC: 0.5 MG/DL — SIGNIFICANT CHANGE UP (ref 0.2–1.2)
BUN SERPL-MCNC: 48 MG/DL — HIGH (ref 7–23)
CALCIUM SERPL-MCNC: 8.4 MG/DL — SIGNIFICANT CHANGE UP (ref 8.4–10.5)
CHLORIDE SERPL-SCNC: 101 MMOL/L — SIGNIFICANT CHANGE UP (ref 96–108)
CO2 SERPL-SCNC: 29 MMOL/L — SIGNIFICANT CHANGE UP (ref 22–31)
CREAT SERPL-MCNC: 2.83 MG/DL — HIGH (ref 0.5–1.3)
EGFR: 15 ML/MIN/1.73M2 — LOW
GLUCOSE SERPL-MCNC: 96 MG/DL — SIGNIFICANT CHANGE UP (ref 70–99)
HCT VFR BLD CALC: 37 % — SIGNIFICANT CHANGE UP (ref 34.5–45)
HGB BLD-MCNC: 12 G/DL — SIGNIFICANT CHANGE UP (ref 11.5–15.5)
MAGNESIUM SERPL-MCNC: 2.1 MG/DL — SIGNIFICANT CHANGE UP (ref 1.6–2.6)
MCHC RBC-ENTMCNC: 30 PG — SIGNIFICANT CHANGE UP (ref 27–34)
MCHC RBC-ENTMCNC: 32.4 GM/DL — SIGNIFICANT CHANGE UP (ref 32–36)
MCV RBC AUTO: 92.5 FL — SIGNIFICANT CHANGE UP (ref 80–100)
NRBC # BLD: 0 /100 WBCS — SIGNIFICANT CHANGE UP (ref 0–0)
PHOSPHATE SERPL-MCNC: 4.4 MG/DL — SIGNIFICANT CHANGE UP (ref 2.5–4.5)
PLATELET # BLD AUTO: 185 K/UL — SIGNIFICANT CHANGE UP (ref 150–400)
POTASSIUM SERPL-MCNC: 3.7 MMOL/L — SIGNIFICANT CHANGE UP (ref 3.5–5.3)
POTASSIUM SERPL-SCNC: 3.7 MMOL/L — SIGNIFICANT CHANGE UP (ref 3.5–5.3)
PROT SERPL-MCNC: 6.1 G/DL — SIGNIFICANT CHANGE UP (ref 6–8.3)
RBC # BLD: 4 M/UL — SIGNIFICANT CHANGE UP (ref 3.8–5.2)
RBC # FLD: 14.9 % — HIGH (ref 10.3–14.5)
SODIUM SERPL-SCNC: 139 MMOL/L — SIGNIFICANT CHANGE UP (ref 135–145)
WBC # BLD: 5.71 K/UL — SIGNIFICANT CHANGE UP (ref 3.8–10.5)
WBC # FLD AUTO: 5.71 K/UL — SIGNIFICANT CHANGE UP (ref 3.8–10.5)

## 2024-05-09 PROCEDURE — 99233 SBSQ HOSP IP/OBS HIGH 50: CPT

## 2024-05-09 PROCEDURE — 93010 ELECTROCARDIOGRAM REPORT: CPT

## 2024-05-09 RX ORDER — CLOPIDOGREL BISULFATE 75 MG/1
75 TABLET, FILM COATED ORAL DAILY
Refills: 0 | Status: DISCONTINUED | OUTPATIENT
Start: 2024-05-09 | End: 2024-05-13

## 2024-05-09 RX ORDER — HYDRALAZINE HCL 50 MG
25 TABLET ORAL EVERY 12 HOURS
Refills: 0 | Status: DISCONTINUED | OUTPATIENT
Start: 2024-05-09 | End: 2024-05-12

## 2024-05-09 RX ORDER — ENOXAPARIN SODIUM 100 MG/ML
55 INJECTION SUBCUTANEOUS EVERY 24 HOURS
Refills: 0 | Status: DISCONTINUED | OUTPATIENT
Start: 2024-05-09 | End: 2024-05-10

## 2024-05-09 RX ADMIN — CARVEDILOL PHOSPHATE 25 MILLIGRAM(S): 80 CAPSULE, EXTENDED RELEASE ORAL at 18:10

## 2024-05-09 RX ADMIN — Medication 81 MILLIGRAM(S): at 12:10

## 2024-05-09 RX ADMIN — SENNA PLUS 1 TABLET(S): 8.6 TABLET ORAL at 21:04

## 2024-05-09 RX ADMIN — CARVEDILOL PHOSPHATE 25 MILLIGRAM(S): 80 CAPSULE, EXTENDED RELEASE ORAL at 05:31

## 2024-05-09 RX ADMIN — SERTRALINE 25 MILLIGRAM(S): 25 TABLET, FILM COATED ORAL at 12:10

## 2024-05-09 RX ADMIN — Medication 60 MILLIGRAM(S): at 05:31

## 2024-05-09 RX ADMIN — ENOXAPARIN SODIUM 55 MILLIGRAM(S): 100 INJECTION SUBCUTANEOUS at 12:10

## 2024-05-09 RX ADMIN — Medication 50 MICROGRAM(S): at 05:30

## 2024-05-09 RX ADMIN — Medication 1 TABLET(S): at 12:10

## 2024-05-09 RX ADMIN — HEPARIN SODIUM 5000 UNIT(S): 5000 INJECTION INTRAVENOUS; SUBCUTANEOUS at 05:31

## 2024-05-09 RX ADMIN — ATORVASTATIN CALCIUM 20 MILLIGRAM(S): 80 TABLET, FILM COATED ORAL at 21:05

## 2024-05-09 RX ADMIN — POLYETHYLENE GLYCOL 3350 17 GRAM(S): 17 POWDER, FOR SOLUTION ORAL at 12:10

## 2024-05-09 RX ADMIN — Medication 25 MILLIGRAM(S): at 18:10

## 2024-05-09 RX ADMIN — CLOPIDOGREL BISULFATE 75 MILLIGRAM(S): 75 TABLET, FILM COATED ORAL at 12:10

## 2024-05-09 RX ADMIN — Medication 50 MILLIGRAM(S): at 05:30

## 2024-05-09 NOTE — DIETITIAN NUTRITION RISK NOTIFICATION - TREATMENT: THE FOLLOWING DIET HAS BEEN RECOMMENDED
Diet, Renal Restrictions:   For patients receiving Renal Replacement - No Protein Restr, No Conc K, No Conc Phos, Low Sodium (05-08-24 @ 09:46) [Active]

## 2024-05-09 NOTE — OCCUPATIONAL THERAPY INITIAL EVALUATION ADULT - RANGE OF MOTION, PT EVAL
Pt. will be educated on UE therex to maximize AROM in order to maximize independence in ADLs/IADLs, within 2-3sessions.

## 2024-05-09 NOTE — PROGRESS NOTE ADULT - ASSESSMENT
89 y/o F with PMH ESRD on HD Tu/Th/Sat, chronic diastolic CHF, HTN, anemia of chronic disease, HLD, hypothyroidism, depression, anxiety, hx of VTE, severe protein calorie malnutrition,  [facility], and [facility] admitted for AMS, hypertensive emergency, and SOB r/o acute CHF exacerbation.      metabolic encephalopathy and htn, admitted for Hypertensive emergency- BP improved   Acute on chronic diastolic CHF exacerbation vs fluid overload 2/2 ESRD on HD  -Cardiac monitoring  -Trop elevated likely demand and 2/2 ESRD  -CTH unremarkable for acute pathology  -Continue supplemental oxygen with nasal canula to maintain SpO2 > 92%  -Continuous pulse ox  -RVP negative   TTE Echo Complete w/o Contrast w/ Doppler (05.08.24 @ 10:37) >Mildly decreased global left ventricular systolic function Left ventricular ejection fraction, by visualstimation, is 45 to 50%.  -Continue coreg 25mg q12h, hydralazine 50mg q8h, nifedipine 60mg daily   - cardio consulted- recc noted  ''overall suspect medical management due to advanced age, end stage renal disease and risk of complications with coronary angiogram. Discussed with patient's son who agrees with plan. In regards to HFmrEF, there is no clear indication for ARNI in this situation and SGLT2i and/or MRA may not be able to be tolerated due to end stage renal disease. Consider additional Hydralazine for BP control. Dialysis per Renal for fluid removal.''  - resp status improved post HD     Transaminitis   -Monitor, avoid hepatotoxins    ESRD on HD Tu/Th/Sat  Anemia of chronic disease  -Nephro following recc noted   -HD per nephro  -Monitor CBC, BMP    Hypothyroidism   -Continue synthroid    HLD  -Continue Lipitor 20mg qhs      Code Status - DNR/DNI - son to bring MOLST  HCP - son Corby Smith      dispo- likely [facility], pending pt/ot eval and cardio clearance     time spent in e/m visit- 50 min  89 y/o F with PMH ESRD on HD Tu/Th/Sat, chronic diastolic CHF, HTN, anemia of chronic disease, HLD, hypothyroidism, depression, anxiety, hx of VTE, severe protein calorie malnutrition,  BIBEMS from [facility] for AMS, SOB, and HTN admitted for AMS, hypertensive emergency, and SOB r/o acute CHF exacerbation.      metabolic encephalopathy and htn, admitted for Hypertensive emergency- BP improved   Acute on chronic diastolic CHF exacerbation vs fluid overload 2/2 ESRD on HD  -Cardiac monitoring  -Trop elevated likely demand and 2/2 ESRD  -CTH unremarkable for acute pathology  -Continue supplemental oxygen with nasal canula to maintain SpO2 > 92%  -Continuous pulse ox  -RVP negative   TTE Echo Complete w/o Contrast w/ Doppler (05.08.24 @ 10:37) >Mildly decreased global left ventricular systolic function Left ventricular ejection fraction, by visual Estimation, is 45 to 50%.  -Continue coreg 25mg q12h, hydralazine 50mg q8h, nifedipine 60mg daily   - cardio consulted- recc noted  ''overall suspect medical management due to advanced age, end stage renal disease and risk of complications with coronary angiogram. Discussed with patient's son who agrees with plan. In regards to HFmrEF, there is no clear indication for ARNI in this situation and SGLT2i and/or MRA may not be able to be tolerated due to end stage renal disease. Consider additional Hydralazine for BP control. Dialysis per Renal for fluid removal.''  - resp status improved post HD   - c/w coreg, hydralazine, nifedipine  - c/w asa    Transaminitis   -Monitor, avoid hepatotoxins    ESRD on HD Tu/Th/Sat  Anemia of chronic disease  -Nephro following recc noted   -HD per nephro  -Monitor CBC, BMP    Hypothyroidism   -Continue synthroid    HLD  -Continue Lipitor 20mg qhs      Code Status - DNR/DNI - son to bring MOLST  HCP - son Corby Smith      dispo- likely [facility], pending pt/ot eval and cardio clearance     time spent in e/m visit- 50 min  89 y/o F with PMH ESRD on HD Tu/Th/Sat, chronic diastolic CHF, HTN, anemia of chronic disease, HLD, hypothyroidism, depression, anxiety, hx of VTE, severe protein calorie malnutrition,  BIBEMS from [facility] for AMS, SOB, and HTN admitted for AMS, hypertensive emergency, and SOB r/o acute CHF exacerbation.    NTEMI   metabolic encephalopathy and htn, admitted for Hypertensive emergency- BP improved   Acute on chronic diastolic CHF exacerbation vs fluid overload 2/2 ESRD on HD  -Cardiac monitoring  -Trop elevated likely demand and 2/2 ESRD- will trend   -CTH unremarkable for acute pathology  -Continue supplemental oxygen with nasal canula to maintain SpO2 > 92%  -Continuous pulse ox  -RVP negative   TTE Echo Complete w/o Contrast w/ Doppler (05.08.24 @ 10:37) >Mildly decreased global left ventricular systolic function Left ventricular ejection fraction, by visual Estimation, is 45 to 50%.  -Continue coreg 25mg q12h, hydralazine 50mg q8h, nifedipine 60mg daily   - cardio consulted- recc noted  ''overall suspect medical management due to advanced age, end stage renal disease and risk of complications with coronary angiogram. Discussed with patient's son who agrees with plan. In regards to HFmrEF, there is no clear indication for ARNI in this situation and SGLT2i and/or MRA may not be able to be tolerated due to end stage renal disease. Consider additional Hydralazine for BP control. Dialysis per Renal for fluid removal.''  - resp status improved post HD   - c/w coreg, hydralazine, nifedipine  - c/w asa  - will start Lovenox treatment purdy x 48 hrs per cardio and start Plavix today    Transaminitis   -Monitor, avoid hepatotoxins    ESRD on HD Tu/Th/Sat  Anemia of chronic disease  -Nephro following recc noted   -HD per nephro  -Monitor CBC, BMP    Hypothyroidism   -Continue synthroid    HLD  -Continue Lipitor 20mg qhs      Code Status - DNR/DNI - son to bring MOLST  HCP - son Corby Smith      dispo- likely [facility], pending pt/ot eval and cardio clearance     time spent in e/m visit- 50 min  89 y/o F with PMH ESRD on HD Tu/Th/Sat, chronic diastolic CHF, HTN, anemia of chronic disease, HLD, hypothyroidism, depression, anxiety, hx of VTE, severe protein calorie malnutrition,  BIBEMS from [facility] for AMS, SOB, and HTN admitted for AMS, hypertensive emergency, and SOB r/o acute CHF exacerbation.    NTEMI   metabolic encephalopathy and htn, admitted for Hypertensive emergency- BP improved   Acute on chronic diastolic CHF exacerbation vs fluid overload 2/2 ESRD on HD  -Cardiac monitoring  -Trop elevated likely demand and 2/2 ESRD- will trend   -CTH unremarkable for acute pathology  -Continue supplemental oxygen with nasal canula to maintain SpO2 > 92%  -Continuous pulse ox  -RVP negative   TTE Echo Complete w/o Contrast w/ Doppler (05.08.24 @ 10:37) >Mildly decreased global left ventricular systolic function Left ventricular ejection fraction, by visual Estimation, is 45 to 50%.  - cardio consulted- recc noted  ''overall suspect medical management due to advanced age, end stage renal disease and risk of complications with coronary angiogram. Discussed with patient's son who agrees with plan. In regards to HFmrEF, there is no clear indication for ARNI in this situation and SGLT2i and/or MRA may not be able to be tolerated due to end stage renal disease. Consider additional Hydralazine for BP control. Dialysis per Renal for fluid removal.''  - resp status improved post HD   - c/w coreg, hydralazine, nifedipine  - c/w asa  - will start Lovenox treatment dose x 48 hrs per cardio and start, Plavix today    Transaminitis   -Monitor, avoid hepatotoxins    ESRD on HD Tu/Th/Sat  Anemia of chronic disease  -Nephro following recc noted   -HD per nephro  -Monitor CBC, BMP    Hypothyroidism   -Continue synthroid    HLD  -Continue Lipitor 20mg qhs    Code Status - DNR/DNI - son to bring MOLST  HCP - son updated, Corby Smith    texted  dr. jam kamara cardio- cell [phone], office- [phone], awaiting call back    dispo- likely [place], pending pt/ot eval and cardio clearance     time spent in e/m visit- 50 min  89 y/o F with PMH ESRD on HD Tu/Th/Sat, chronic diastolic CHF, HTN, anemia of chronic disease, HLD, hypothyroidism, depression, anxiety, hx of VTE, severe protein calorie malnutrition,  BIBEMS from [facility] for AMS, SOB, and HTN admitted for AMS, hypertensive emergency, and SOB r/o acute CHF exacerbation.    NTEMI   metabolic encephalopathy and htn, admitted for Hypertensive emergency- BP improved   Acute on chronic diastolic CHF exacerbation vs fluid overload 2/2 ESRD on HD  -Cardiac monitoring  -Trop elevated will trend   -CTH unremarkable for acute pathology  -Continue supplemental oxygen with nasal canula to maintain SpO2 > 92%  -Continuous pulse ox  -RVP negative   TTE Echo Complete w/o Contrast w/ Doppler (05.08.24 @ 10:37) >Mildly decreased global left ventricular systolic function Left ventricular ejection fraction, by visual Estimation, is 45 to 50%.  - cardio consulted- recc noted  ''overall suspect medical management due to advanced age, end stage renal disease and risk of complications with coronary angiogram. Discussed with patient's son who agrees with plan. In regards to HFmrEF, there is no clear indication for ARNI in this situation and SGLT2i and/or MRA may not be able to be tolerated due to end stage renal disease. Consider additional Hydralazine for BP control. Dialysis per Renal for fluid removal.''  - resp status improved post HD   - c/w coreg, hydralazine, nifedipine  - c/w asa  - will start Lovenox treatment dose x 48 hrs per cardio and start, Plavix today    Transaminitis   -Monitor, avoid hepatotoxins    ESRD on HD Tu/Th/Sat  Anemia of chronic disease  -Nephro following recc noted   -HD per nephro  -Monitor CBC, BMP    Hypothyroidism   -Continue synthroid    HLD  -Continue Lipitor 20mg qhs    Code Status - DNR/DNI - son to bring MOLST  HCP - son updated, Imad Ketanyali    texted  dr. jam kamara cardio- cell [phone], office- [phone], awaiting call back    dispo- likely [facility], pending pt/ot eval and cardio clearance     time spent in e/m visit- 50 min

## 2024-05-09 NOTE — PROGRESS NOTE ADULT - SUBJECTIVE AND OBJECTIVE BOX
Feels better, comfortable on RA    Vital Signs Last 24 Hrs  T(C): 36.6 (05-09-24 @ 18:08), Max: 37.1 (05-08-24 @ 19:22)  T(F): 97.8 (05-09-24 @ 18:08), Max: 98.8 (05-08-24 @ 19:22)  HR: 71 (05-09-24 @ 18:08) (55 - 90)  BP: 158/75 (05-09-24 @ 18:08) (125/55 - 158/75)  BP(mean): 87 (05-08-24 @ 18:45) (87 - 87)  RR: 17 (05-09-24 @ 18:08) (15 - 17)  SpO2: 95% (05-09-24 @ 18:08) (95% - 98%)    I&O's Detail    08 May 2024 07:01  -  09 May 2024 07:00  --------------------------------------------------------  OUT:    Other (mL): 1500 mL  Total OUT: 1500 mL    s1s2  b/l air entry  soft, ND  no edema                         12.0   5.71  )-----------( 185      ( 09 May 2024 07:59 )             37.0     09 May 2024 07:59    139    |  101    |  48     ----------------------------<  96     3.7     |  29     |  2.83     Ca    8.4        09 May 2024 07:59  Phos  4.4       09 May 2024 07:59  Mg     2.1       09 May 2024 07:59    TPro  6.1    /  Alb  2.8    /  TBili  0.5    /  DBili  x      /  AST  30     /  ALT  45     /  AlkPhos  74     09 May 2024 07:59    LIVER FUNCTIONS - ( 09 May 2024 07:59 )  Alb: 2.8 g/dL / Pro: 6.1 g/dL / ALK PHOS: 74 U/L / ALT: 45 U/L / AST: 30 U/L / GGT: x           aspirin  chewable 81 milliGRAM(s) Oral daily  atorvastatin 20 milliGRAM(s) Oral at bedtime  carvedilol 25 milliGRAM(s) Oral every 12 hours  clopidogrel Tablet 75 milliGRAM(s) Oral daily  enoxaparin Injectable 55 milliGRAM(s) SubCutaneous every 24 hours  hydrALAZINE 25 milliGRAM(s) Oral every 12 hours  levothyroxine 50 MICROGram(s) Oral daily  Nephro-luis 1 Tablet(s) Oral daily  NIFEdipine XL 60 milliGRAM(s) Oral daily  polyethylene glycol 3350 17 Gram(s) Oral daily  senna 1 Tablet(s) Oral at bedtime  sertraline 25 milliGRAM(s) Oral daily    A/P:    ESRD on HD   Adm w/CHF iso NSTEMI  Next HD tomorrow   TMP as able  Cards f/u appr  Will follow     [phone]

## 2024-05-09 NOTE — DIETITIAN INITIAL EVALUATION ADULT - OTHER INFO
89 y/o F with PMH ESRD on HD Tu/Th/Sat, chronic diastolic CHF, HTN, anemia of chronic disease, HLD, hypothyroidism, depression, anxiety, hx of VTE, severe protein calorie malnutrition,  admitted  from [facility] for AMS, SOB, and HTN.  Patient reports tolerating diet appetite is reported fair , patient reports ~  20 lb weight loss in March , NFPE conducted , (+) muscle wasting /loss of body fat observed , (+) BM (5/9) Skin intact, no edema  Patient noted to receive HD x 3 week ,  89 y/o F with PMH ESRD on HD Tu/Th/Sat, chronic diastolic CHF, HTN, anemia of chronic disease, HLD, hypothyroidism, depression, anxiety, hx of VTE, severe protein calorie malnutrition,  admitted  from [facility] for AMS, SOB, and HTN.  Patient reports tolerating diet appetite is reported fair , patient reports ~  20 lb weight loss in March , NFPE conducted , (+) muscle wasting /loss of body fat observed , patient may benefit from addition of Nepro QD (420kcals, 19gms protein)(+) BM (5/9) Skin intact, no edema  Patient noted to receive HD x 3 week ,

## 2024-05-09 NOTE — PROGRESS NOTE ADULT - SUBJECTIVE AND OBJECTIVE BOX
89 y/o F with PMH ESRD on HD Tu/Th/Sat, chronic diastolic CHF, HTN, anemia of chronic disease, HLD, hypothyroidism, depression, anxiety, hx of VTE, severe protein calorie malnutrition,  B[facility]EMS from [facility] for [facility], SOB, and HTN. On initial eval pt noted with tachypnea, diaphoresis, awake but not responsive, /125, HR 81, RR 25, O2 93% on RA, s/p lasix 40mg IVP x1, hydralazine 10mg IVP x1 with SBP improved to 168, mentation now baseline, AAOx3. Patient endorses headache, nausea, no emesis, some SOB now improved. Denies change in vision, cp, palpitation, cough, diarrhea, constipation, abd pain.      above per h/p    seen at the bedside, no new complaints today.   no n/v, no sob, cp.    Vital Signs Last 24 Hrs  T(C): 36.7 (09 May 2024 05:22), Max: 37.1 (08 May 2024 19:22)  T(F): 98.1 (09 May 2024 05:22), Max: 98.8 (08 May 2024 19:22)  HR: 90 (09 May 2024 05:22) (55 - 90)  BP: 145/35 (09 May 2024 05:22) (125/55 - 170/84)  BP(mean): 87 (08 May 2024 18:45) (87 - 87)  RR: 17 (09 May 2024 05:22) (16 - 17)  SpO2: 96% (09 May 2024 05:22) (96% - 100%)    Parameters below as of 09 May 2024 05:22  Patient On (Oxygen Delivery Method): nasal cannula  O2 Flow (L/min): 2      GENERAL- NAD  EAR/NOSE/MOUTH/THROAT - MMM  EYES- ELVIA, conjunctiva and Sclera clear  NECK- supple  RESPIRATORY-  clear to auscultation bilaterally, non laboured breathing  CARDIOVASCULAR - SIS2, RRR  GI - soft NT BS present  EXTREMITIES- no pedal edema  NEUROLOGY- no gross focal deficits  PSYCHIATRY- AAO X 3                    12.0                 139  | 29   | 48           5.71  >-----------< 185     ------------------------< 96                    37.0                 3.7  | 101  | 2.83                                         Ca 8.4   Mg 2.1   Ph 4.4        Labs reviewed:     CXR personally reviewed: < from: Xray Chest 1 View-PORTABLE IMMEDIATE (05.08.24 @ 07:41) >    IMPRESSION: Probable heart enlargement. COPD. CHF. Large right jugular   line.    < end of copied text >    < from: CT Head No Cont (05.08.24 @ 08:27) >    IMPRESSION:  No acute intracranial hemorrhage or acute territorial infarct.  If   symptoms persist, follow-up MRI exam recommended.    < end of copied text >    ECG reviewed and interpreted: sinus with PVCs , HR 83, qtc 481    MEDICATIONS  (STANDING):  aspirin  chewable 81 milliGRAM(s) Oral daily  atorvastatin 20 milliGRAM(s) Oral at bedtime  carvedilol 25 milliGRAM(s) Oral every 12 hours  heparin   Injectable 5000 Unit(s) SubCutaneous every 8 hours  hydrALAZINE 50 milliGRAM(s) Oral every 8 hours  levothyroxine 50 MICROGram(s) Oral daily  Nephro-luis 1 Tablet(s) Oral daily  NIFEdipine XL 60 milliGRAM(s) Oral daily  polyethylene glycol 3350 17 Gram(s) Oral daily  senna 1 Tablet(s) Oral at bedtime  sertraline 25 milliGRAM(s) Oral daily    MEDICATIONS  (PRN):

## 2024-05-09 NOTE — PATIENT PROFILE ADULT - TRANSPORTATION
no
Quality 47: Advance Care Plan: Advance care planning not documented, reason not otherwise specified.
Quality 431: Preventive Care And Screening: Unhealthy Alcohol Use - Screening: Patient screened for unhealthy alcohol use using a single question and scores less than 2 times per year
Quality 226: Preventive Care And Screening: Tobacco Use: Screening And Cessation Intervention: Patient screened for tobacco use and is an ex/non-smoker
Quality 137: Melanoma: Continuity Of Care - Recall System: Recall system not utilized, reason not otherwise specified
Quality 130: Documentation Of Current Medications In The Medical Record: Current Medications Documented
Detail Level: Detailed

## 2024-05-09 NOTE — DIETITIAN INITIAL EVALUATION ADULT - PERTINENT MEDS FT
MEDICATIONS  (STANDING):  aspirin  chewable 81 milliGRAM(s) Oral daily  atorvastatin 20 milliGRAM(s) Oral at bedtime  carvedilol 25 milliGRAM(s) Oral every 12 hours  clopidogrel Tablet 75 milliGRAM(s) Oral daily  enoxaparin Injectable 55 milliGRAM(s) SubCutaneous every 24 hours  hydrALAZINE 25 milliGRAM(s) Oral every 12 hours  levothyroxine 50 MICROGram(s) Oral daily  Nephro-luis 1 Tablet(s) Oral daily  NIFEdipine XL 60 milliGRAM(s) Oral daily  polyethylene glycol 3350 17 Gram(s) Oral daily  senna 1 Tablet(s) Oral at bedtime  sertraline 25 milliGRAM(s) Oral daily    MEDICATIONS  (PRN):

## 2024-05-09 NOTE — OCCUPATIONAL THERAPY INITIAL EVALUATION ADULT - PERTINENT HX OF CURRENT PROBLEM, REHAB EVAL
Per Chart review: 89 y/o F with PMH ESRD on HD Tu/Th/Sat, chronic diastolic CHF, HTN, anemia of chronic disease, HLD, hypothyroidism, depression, anxiety, hx of VTE, severe protein calorie malnutrition,  [facility]EMS from [facility] for AMS, SOB, and HTN. On initial eval pt noted with tachypnea, diaphoresis, awake but not responsive, /125, HR 81, RR 25, O2 93% on RA, s/p lasix 40mg IVP x1, hydralazine 10mg IVP x1 with SBP improved to 168, mentation now baseline, AAOx3. Patient endorses headache, nausea, no emesis, some SOB now improved. Denies change in vision, cp, palpitation, cough, diarrhea, constipation, abd pain.

## 2024-05-09 NOTE — DIETITIAN INITIAL EVALUATION ADULT - ORAL INTAKE PTA/DIET HISTORY
As per nursing facility transfer information patient receives a renal diet with Nepro QD po supplement

## 2024-05-09 NOTE — PHYSICAL THERAPY INITIAL EVALUATION ADULT - GAIT TRAINING, PT EVAL
in 1-3 treatments patient will ambulate 200feet with RW independently and climb 4 steps with 1 rail with supervision

## 2024-05-09 NOTE — PHYSICAL THERAPY INITIAL EVALUATION ADULT - NSPTDISCHREC_GEN_A_CORE
Pt has some high level balance difficulties and lives alone most of the day, with the son home late in the evening. Would benefit from SUZIE until independent. would also recommend home with PT if pt can get HHA services/Sub-acute Rehab

## 2024-05-09 NOTE — PHYSICAL THERAPY INITIAL EVALUATION ADULT - PERTINENT HX OF CURRENT PROBLEM, REHAB EVAL
89 y/o F with PMH ESRD on HD Tu/Th/Sat, chronic diastolic CHF, HTN, anemia of chronic disease, HLD, hypothyroidism, depression, anxiety, hx of VTE, severe protein calorie malnutrition,  B[facility]EMS from [facility] for AMS, SOB, and HTN. On initial eval pt noted with tachypnea, diaphoresis, awake but not responsive, /125, HR 81, RR 25, O2 93% on RA, s/p lasix 40mg IVP x1, hydralazine 10mg IVP x1 with SBP improved to 168, mentation now baseline, AAOx3. Patient endorses headache, nausea, no emesis, some SOB now improved. Denies change in vision, cp, palpitation, cough, diarrhea, constipation, abd pain.

## 2024-05-09 NOTE — OCCUPATIONAL THERAPY INITIAL EVALUATION ADULT - ADDITIONAL COMMENTS
Pt is a 89 yr old Right hand Dominant female, admitted from [facility], where she was receiving rehab.  Pt. indicated that prior to March 2024, pt. was living independently in a 1st floor apartment in son's home, with 4 GENE with Shower (Grab bar, shower chair, and Hand Held Shower.) Pt. reported she had a  1x/wk, with assist with grocery shopping and laundry on occasion. No glasses and no driving at baseline.

## 2024-05-09 NOTE — OCCUPATIONAL THERAPY INITIAL EVALUATION ADULT - GENERAL OBSERVATIONS, REHAB EVAL
Pt. received semi-supine in bed, AOx3-4, agreeable to skilled OT services. +IV, +Monitoring, +Primafit.

## 2024-05-09 NOTE — PROGRESS NOTE ADULT - SUBJECTIVE AND OBJECTIVE BOX
SUBJ:  Patient is a 89y old  Female who presents with a chief complaint of SOB, HTN (09 May 2024 10:37)  The patient is seen and examined.  The chart is reviewed.  Case discussed with Dr. Nogueira.  Patient sitting up in bed, appears comfortable, denies any cardiopulmonary complaints.      PAST MEDICAL & SURGICAL HISTORY:  ESRD on dialysis      HTN (hypertension)      HLD (hyperlipidemia)      Hypothyroidism      Anxiety and depression          MEDICATIONS  (STANDING):  aspirin  chewable 81 milliGRAM(s) Oral daily  atorvastatin 20 milliGRAM(s) Oral at bedtime  carvedilol 25 milliGRAM(s) Oral every 12 hours  heparin   Injectable 5000 Unit(s) SubCutaneous every 8 hours  hydrALAZINE 50 milliGRAM(s) Oral every 8 hours  levothyroxine 50 MICROGram(s) Oral daily  Nephro-luis 1 Tablet(s) Oral daily  NIFEdipine XL 60 milliGRAM(s) Oral daily  polyethylene glycol 3350 17 Gram(s) Oral daily  senna 1 Tablet(s) Oral at bedtime  sertraline 25 milliGRAM(s) Oral daily    MEDICATIONS  (PRN):          Vital Signs Last 24 Hrs  T(C): 36.8 (09 May 2024 10:46), Max: 37.1 (08 May 2024 19:22)  T(F): 98.2 (09 May 2024 10:46), Max: 98.8 (08 May 2024 19:22)  HR: 74 (09 May 2024 10:46) (55 - 90)  BP: 132/56 (09 May 2024 10:46) (125/55 - 170/84)  BP(mean): 87 (08 May 2024 18:45) (87 - 87)  RR: 15 (09 May 2024 10:46) (15 - 17)  SpO2: 97% (09 May 2024 10:46) (96% - 100%)    Parameters below as of 09 May 2024 10:46  Patient On (Oxygen Delivery Method): nasal cannula  O2 Flow (L/min): 2      REVIEW OF SYSTEMS:  CONSTITUTIONAL: No fever, weight loss, or fatigue  RESPIRATORY: No cough, wheezing, chills or hemoptysis; No shortness of breath  CARDIOVASCULAR: No chest pain or chest pressure.  No shortness of breath or dyspnea on exertion.  No palpitations, dizziness, light headedness, syncope or near syncope.  No edema, no orthopnea.   NEUROLOGICAL: No headaches, memory loss, loss of strength, numbness, or tremors      PHYSICAL EXAM  Constitutional:  WDWN. No acute distress  HEENT: normocephalic, atraumatic.  PERRLA. EOMI  Neck : No JVD. no carotid bruits  Lungs:  clear to auscultation bilaterally. no rhonchi. no wheezing  Heart:  S1 and S2. No S3, S4. II/VI systolic murmur.  Abdomen:  soft, non tender.  Extremities: No clubbing, cyanoisis or edema  Nuerologic:  A+O x 3. No focal deficits  Skin:  no rashes        LABS:                        12.0   5.71  )-----------( 185      ( 09 May 2024 07:59 )             37.0     05-09    139  |  101  |  48<H>  ----------------------------<  96  3.7   |  29  |  2.83<H>    Ca    8.4      09 May 2024 07:59  Phos  4.4     05-09  Mg     2.1     05-09    TPro  6.1  /  Alb  2.8<L>  /  TBili  0.5  /  DBili  x   /  AST  30  /  ALT  45  /  AlkPhos  74  05-09  I&O's Summary    08 May 2024 07:01  -  09 May 2024 07:00  --------------------------------------------------------  IN: 0 mL / OUT: 1500 mL / NET: -1500 mL    < from: 12 Lead ECG (05.09.24 @ 06:00) >  Diagnosis Line Sinus rhythm with premature atrial complexes  T wave abnormality, consider inferior ischemia  T wave abnormality, consider anterolateral ischemia  Prolonged QT  Abnormal ECG  When compared with ECG of 08-MAY-2024 07:31,  T wave inversion now evident in Inferior leads  T wave inversion more evident in Anterolateral leads  QT has lengthened    < end of copied text >  < from: TTE Echo Complete w/o Contrast w/ Doppler (05.08.24 @ 10:37) >   1. Mildly decreased global left ventricular systolic function.   2. Left ventricular ejection fraction, by visual estimation, is 45 to   50%.   3. Mild global left ventricle hypokinesis with regional variation; the   inferoseptal wall and anteroseptal wall appear severely   hypokinetic/akinetic. False tendon visualized.   4. Moderately increased LV wall thickness.   5. Normal left ventricular internal cavity size.   6. Normal right ventricular size and function.   7. The left atrium is normal in size.   8. The right atrium is normal in size.   9. Mild mitral annular calcification.  10. Mild thickening and calcification of the anterior and posterior   mitral valve leaflets.  11. Mild mitral valve regurgitation.  12. Mild tricuspid regurgitation.  13. Aortic valve leaflet calcification with restricted leaflet opening.   Mild aortic valve stenosis (peak velocity 1.5 m/s, mean gradient 5 mmHg,   calculated LILLY by continuity equation 1.6 cm^2, DI 0.5).  14. Mild pulmonic valve regurgitation.  15. Estimated pulmonary artery systolic pressure is 57.8 mmHg assuming a   right atrial pressure of 15 mmHg, which is consistent with moderate   pulmonary hypertension.  16. Small pericardial effusion.  17. Subcostal window not well visualized.    < end of copied text >

## 2024-05-09 NOTE — PROGRESS NOTE ADULT - ASSESSMENT
89-year-old woman admitted from SNF due to altered mental status and dyspnea.  She was noted to have hypertensive emergency with elevated troponin.  Medical issues include end-stage renal disease on hemodialysis, hypertension and hyperlipidemia.  Troponins elevated and rising.  EKG abnormalities noted with T wave inversions.  Echocardiogram with regional wall motion abnormalities.  Suspect current clinical scenario is consistent with NSTEMI.  Patient is DNR status     Recommendations  -Medical treatment for NSTEMI is indicated.  -Start dual antiplatelet medications, aspirin and carvedilol.  -Reasonable to start Lovenox   -Increase atorvastatin to high intensity dose, 80 mg daily.  -Monitor for any signs of fluid overload.  -Monitor labs and trend troponin until peak   -Check old records if available   -Dialysis as per renal.  -Discussed with patient and with primary team.

## 2024-05-09 NOTE — DIETITIAN INITIAL EVALUATION ADULT - PERTINENT LABORATORY DATA
05-09    139  |  101  |  48<H>  ----------------------------<  96  3.7   |  29  |  2.83<H>    Ca    8.4      09 May 2024 07:59  Phos  4.4     05-09  Mg     2.1     05-09    TPro  6.1  /  Alb  2.8<L>  /  TBili  0.5  /  DBili  x   /  AST  30  /  ALT  45  /  AlkPhos  74  05-09

## 2024-05-09 NOTE — PHYSICAL THERAPY INITIAL EVALUATION ADULT - ADDITIONAL COMMENTS
Pt is a 89 yr old Right hand Dominant female, admitted from [facility], where she was receiving rehab.  Pt. indicated that prior to March 2024, pt. was living independently in a 1st floor apartment in son's home, with 4 GENE with Shower (Grab bar, shower chair, and Hand Held Shower.) Pt. reported she had a  1x/wk, with assist with grocery shopping and laundry on occasion. No glasses and no driving at baseline.

## 2024-05-10 PROCEDURE — 93010 ELECTROCARDIOGRAM REPORT: CPT

## 2024-05-10 PROCEDURE — 99233 SBSQ HOSP IP/OBS HIGH 50: CPT

## 2024-05-10 PROCEDURE — 93010 ELECTROCARDIOGRAM REPORT: CPT | Mod: 77

## 2024-05-10 PROCEDURE — 99232 SBSQ HOSP IP/OBS MODERATE 35: CPT

## 2024-05-10 RX ORDER — HYDRALAZINE HCL 50 MG
10 TABLET ORAL ONCE
Refills: 0 | Status: COMPLETED | OUTPATIENT
Start: 2024-05-10 | End: 2024-05-10

## 2024-05-10 RX ADMIN — Medication 1 TABLET(S): at 17:38

## 2024-05-10 RX ADMIN — SENNA PLUS 1 TABLET(S): 8.6 TABLET ORAL at 21:19

## 2024-05-10 RX ADMIN — Medication 50 MICROGRAM(S): at 05:01

## 2024-05-10 RX ADMIN — Medication 25 MILLIGRAM(S): at 17:38

## 2024-05-10 RX ADMIN — CARVEDILOL PHOSPHATE 25 MILLIGRAM(S): 80 CAPSULE, EXTENDED RELEASE ORAL at 17:38

## 2024-05-10 RX ADMIN — Medication 25 MILLIGRAM(S): at 05:01

## 2024-05-10 RX ADMIN — Medication 10 MILLIGRAM(S): at 01:58

## 2024-05-10 RX ADMIN — CARVEDILOL PHOSPHATE 25 MILLIGRAM(S): 80 CAPSULE, EXTENDED RELEASE ORAL at 05:01

## 2024-05-10 RX ADMIN — ATORVASTATIN CALCIUM 20 MILLIGRAM(S): 80 TABLET, FILM COATED ORAL at 21:19

## 2024-05-10 RX ADMIN — SERTRALINE 25 MILLIGRAM(S): 25 TABLET, FILM COATED ORAL at 11:26

## 2024-05-10 RX ADMIN — CLOPIDOGREL BISULFATE 75 MILLIGRAM(S): 75 TABLET, FILM COATED ORAL at 11:26

## 2024-05-10 RX ADMIN — Medication 60 MILLIGRAM(S): at 05:01

## 2024-05-10 RX ADMIN — Medication 81 MILLIGRAM(S): at 11:26

## 2024-05-10 RX ADMIN — POLYETHYLENE GLYCOL 3350 17 GRAM(S): 17 POWDER, FOR SOLUTION ORAL at 11:26

## 2024-05-10 NOTE — PROGRESS NOTE ADULT - NUTRITIONAL ASSESSMENT
This patient has been assessed with a concern for Malnutrition and has been determined to have a diagnosis/diagnoses of Severe protein-calorie malnutrition.    This patient is being managed with:   Diet Renal Restrictions-  For patients receiving Renal Replacement - No Protein Restr No Conc K No Conc Phos Low Sodium  Supplement Feeding Modality:  Oral  Nepro Cans or Servings Per Day:  1       Frequency:  Daily  Entered: May  9 2024  1:27PM    Diet Renal Restrictions-  For patients receiving Renal Replacement - No Protein Restr No Conc K No Conc Phos Low Sodium  Entered: May  8 2024  9:44AM    The following pending diet order is being considered for treatment of Severe protein-calorie malnutrition:null This patient has been assessed with a concern for Malnutrition and has been determined to have a diagnosis/diagnoses of Severe protein-calorie malnutrition.    This patient is being managed with:   Diet Renal Restrictions-  For patients receiving Renal Replacement - No Protein Restr No Conc K No Conc Phos Low Sodium  Supplement Feeding Modality:  Oral  Nepro Cans or Servings Per Day:  1       Frequency:  Daily  Entered: May  9 2024  1:27PM    Diet Renal Restrictions-  For patients receiving Renal Replacement - No Protein Restr No Conc K No Conc Phos Low Sodium  Entered: May  8 2024  9:44AM    The following pending diet order is being considered for treatment of Severe protein-calorie malnutrition: null

## 2024-05-10 NOTE — DISCHARGE NOTE PROVIDER - NSDCMRMEDTOKEN_GEN_ALL_CORE_FT
aspirin 81 mg oral tablet, chewable: 1 tab(s) chewed once a day  carvedilol 25 mg oral tablet: 1 tab(s) orally every 12 hours  Ferretts Iron 325 mg (106 mg elemental iron) oral tablet: 1 tab(s) orally once a day  hydrALAZINE 50 mg oral tablet: 1 tab(s) orally every 8 hours  Lipitor 20 mg oral tablet: 1 tab(s) orally once a day (at bedtime)  Nephro-Jocelyn oral tablet: 1 tab(s) orally once a day  NIFEdipine 60 mg oral tablet, extended release: 1 tab(s) orally once a day  sertraline 25 mg oral tablet: 1 tab(s) orally once a day  Synthroid 50 mcg (0.05 mg) oral tablet: 1 tab(s) orally once a day   aspirin 81 mg oral tablet, chewable: 1 tab(s) chewed once a day  atorvastatin 40 mg oral tablet: 1 tab(s) orally once a day (at bedtime)  clopidogrel 75 mg oral tablet: 1 tab(s) orally once a day  Ferretts Iron 325 mg (106 mg elemental iron) oral tablet: 1 tab(s) orally once a day  hydrALAZINE 50 mg oral tablet: 1 tab(s) orally every 8 hours  metoprolol succinate 50 mg oral tablet, extended release: 1 tab(s) orally every 24 hours  Nephro-Jocelyn oral tablet: 1 tab(s) orally once a day  NIFEdipine 60 mg oral tablet, extended release: 1 tab(s) orally once a day  sertraline 25 mg oral tablet: 1 tab(s) orally once a day  Synthroid 50 mcg (0.05 mg) oral tablet: 1 tab(s) orally once a day

## 2024-05-10 NOTE — DISCHARGE NOTE PROVIDER - HOSPITAL COURSE
89 y/o F with PMH ESRD on HD Tu/Th/Sat, chronic diastolic CHF, HTN, anemia of chronic disease, HLD, hypothyroidism, depression, anxiety, hx of VTE, severe protein calorie malnutrition,  BIBEMS from [facility] for AMS, SOB, and HTN admitted for AMS, hypertensive emergency, NSTEMI, and SOB r/o acute CHF exacerbation.    hospital course-   seen by cardio and nephrology, meds adjusted RECIEVED HD   trops elevated with ekg changes- NSTEMI per cardio, d/w dr. kamara pt cardiologist, agree with medical management.   c/w asa, plavix, consider acei/arb if ok with renal   TTE Echo Complete w/o Contrast w/ Doppler (05.08.24 @ 10:37) > 1. Mildly decreased global left ventricular systolic function. Left ventricular ejection fraction, by visual estimation, is 45 to 50%. Mild global left ventricle hypokinesis with regional variation; the inferoseptal wall and anteroseptal wall appear severely hypokinetic/akinetic. False tendon visualized.   Hospital Course  HPI:  89 y/o F with PMH ESRD on HD Tu/Th/Sat, chronic diastolic CHF, HTN, anemia of chronic disease, HLD, hypothyroidism, depression, anxiety, hx of VTE, severe protein calorie malnutrition,  B[facility]EMS from [facility] for AMS, SOB, and HTN. On initial eval pt noted with tachypnea, diaphoresis, awake but not responsive, /125, HR 81, RR 25, O2 93% on RA, s/p lasix 40mg IVP x1, hydralazine 10mg IVP x1 with SBP improved to 168, mentation now baseline, AAOx3. Patient endorses headache, nausea, no emesis, some SOB now improved. Denies change in vision, cp, palpitation, cough, diarrhea, constipation, abd pain.   (08 May 2024 08:28)    Patient was admitted to [facility] for NSTEMI, hypertensive emergency   #Acute on chronic diastolic CHF exacerbation   #NSVT morning 5/11  - TTE reviewed  - Appreciate cardiology recs - medical management  - Due to arrhythmia, changed beta-blockers. Stopped carvedilol and started metoprolol succinate 50 mg daily on 5/12  - Continue nifedipine   - Continue hydralazine - increase to 50 mg q8h  - Continue dual antiplatelet therapy  - Continue high intensity statin    #ESRD on HD   #Anemia of chronic disease  - Last HD was Friday 5/10  - Nephro following  - HD per nephro  - Monitor CBC, BMP    #Transaminitis - improved   - Monitor, avoid hepatotoxins    #Hypothyroidism   - Continue synthroid    #HLD  - Continue Lipitor - will increase dose as per cardiology    #DVT ppx  - heparin    Code Status - DNR/DNI    Dispo: Anticipate DC to [facility] once medically    5/13: Updated HCP - son Corby Smith [phone]               Palliative Care / Advanced Care Planning  Code Status: DNR/I    Discharging Provider:  Cat Montaño NP  Contact Info: Cell [phone] - Please call with any questions or concerns.    Outpatient Provider: Dr. Moe Lai [phone].    Signout given to  SNF Provider: Dr Banerjee - notified    Hospital Course  HPI:  89 y/o F with PMH ESRD on HD Tu/Th/Sat, chronic diastolic CHF, HTN, anemia of chronic disease, HLD, hypothyroidism, depression, anxiety, hx of VTE, severe protein calorie malnutrition,  B[facility]EMS from [facility] for AMS, SOB, and HTN. On initial eval pt noted with tachypnea, diaphoresis, awake but not responsive, /125, HR 81, RR 25, O2 93% on RA, s/p lasix 40mg IVP x1, hydralazine 10mg IVP x1 with SBP improved to 168, mentation now baseline, AAOx3. Patient endorses headache, nausea, no emesis, some SOB now improved. Denies change in vision, cp, palpitation, cough, diarrhea, constipation, abd pain.   (08 May 2024 08:28)    Patient was admitted to [facility] for NSTEMI and hypertensive emergency. Cardiology was consulted, recommended medical management. She was started on aspirin and plavix. Patient had episode of NSVT on 5/11. Medications were adjusted. Troponins downtrended. Patient's BP has improved. Nephrology was also consulted for HD, patient had HD on 5/13. Stable for DC to [facility].     Palliative Care / Advanced Care Planning  Code Status: DNR/I    Discharging Provider:  Cat Montaño NP  Contact Info: Cell [phone] - Please call with any questions or concerns.    Outpatient Provider: Dr. Moe Lai [phone] - notified office    Signout given to  [facility] Provider: Dr Banerjee - notified

## 2024-05-10 NOTE — PROGRESS NOTE ADULT - SUBJECTIVE AND OBJECTIVE BOX
Seen on HD    Vital Signs Last 24 Hrs  T(C): 36.7 (05-10-24 @ 21:23), Max: 36.8 (05-10-24 @ 06:00)  T(F): 98 (05-10-24 @ 21:23), Max: 98.3 (05-10-24 @ 06:00)  HR: 75 (05-10-24 @ 21:23) (69 - 76)  BP: 141/56 (05-10-24 @ 21:23) (141/56 - 185/66)  RR: 19 (05-10-24 @ 21:23) (16 - 23)  SpO2: 97% (05-10-24 @ 21:23) (95% - 99%)    I&O's Detail    10 May 2024 07:01  -  10 May 2024 21:48  --------------------------------------------------------  OUT:    Other (mL): 1000 mL    Voided (mL): 400 mL  Total OUT: 1400 mL    s1s2  b/l air entry  soft, ND  no edema                                12.0   5.71  )-----------( 185      ( 09 May 2024 07:59 )             37.0     09 May 2024 07:59    139    |  101    |  48     ----------------------------<  96     3.7     |  29     |  2.83     Ca    8.4        09 May 2024 07:59  Phos  4.4       09 May 2024 07:59  Mg     2.1       09 May 2024 07:59    TPro  6.1    /  Alb  2.8    /  TBili  0.5    /  DBili  x      /  AST  30     /  ALT  45     /  AlkPhos  74     09 May 2024 07:59    LIVER FUNCTIONS - ( 09 May 2024 07:59 )  Alb: 2.8 g/dL / Pro: 6.1 g/dL / ALK PHOS: 74 U/L / ALT: 45 U/L / AST: 30 U/L / GGT: x           aspirin  chewable 81 milliGRAM(s) Oral daily  atorvastatin 20 milliGRAM(s) Oral at bedtime  carvedilol 25 milliGRAM(s) Oral every 12 hours  clopidogrel Tablet 75 milliGRAM(s) Oral daily  hydrALAZINE 25 milliGRAM(s) Oral every 12 hours  levothyroxine 50 MICROGram(s) Oral daily  Nephro-luis 1 Tablet(s) Oral daily  NIFEdipine XL 60 milliGRAM(s) Oral daily  polyethylene glycol 3350 17 Gram(s) Oral daily  senna 1 Tablet(s) Oral at bedtime  sertraline 25 milliGRAM(s) Oral daily    A/P:    ESRD on HD MWF  Adm w/CHF iso NSTEMI  TMP as able  Cards f/u appr  Conservative approach   Can try low dose ACE or ARB    [phone]

## 2024-05-10 NOTE — PROGRESS NOTE ADULT - ASSESSMENT
ESRD on HD  hbp suboptimal  non stemi  ashd    suggest  increase antihypertensive rx  could add ace/arb, since on HD  conservative rx appropriate at 89 without ischemic symptoms, esrd with PHT and DNR status

## 2024-05-10 NOTE — PROGRESS NOTE ADULT - ASSESSMENT
89 y/o F with PMH ESRD on HD Tu/Th/Sat, chronic diastolic CHF, HTN, anemia of chronic disease, HLD, hypothyroidism, depression, anxiety, hx of VTE, severe protein calorie malnutrition,  BIBEMS from [facility] for AMS, SOB, and HTN admitted for AMS, hypertensive emergency, and SOB r/o acute CHF exacerbation. On initial eval pt noted with tachypnea, diaphoresis, awake but not responsive, /125, HR 81, RR 25, O2 93% on RA, s/p lasix 40mg IVP x1, hydralazine 10mg IVP x1 with SBP improved to 168      NTEMI   metabolic encephalopathy and htn, admitted for Hypertensive emergency- BP improved   Acute on chronic diastolic CHF exacerbation ,ESRD on HD  -Cardiac monitoring  -Trop elevated - will trend   -CTH unremarkable for acute pathology  -Continue supplemental oxygen with nasal canula to maintain SpO2 > 92%  -Continuous pulse ox  -RVP negative   TTE Echo Complete w/o Contrast w/ Doppler (05.08.24 @ 10:37) >Mildly decreased global left ventricular systolic function Left ventricular ejection fraction, by visual Estimation, is 45 to 50%.  - cardio consulted- recc noted  ''overall suspect medical management due to advanced age, end stage renal disease and risk of complications with coronary angiogram. Discussed with patient's son who agrees with plan. In regards to HFmrEF, there is no clear indication for ARNI in this situation and SGLT2i and/or MRA may not be able to be tolerated due to end stage renal disease. Consider additional Hydralazine for BP control. Dialysis per Renal for fluid removal.''  - resp status improved post HD   - BP noted to be elevated overnight- will defer to renal to adjust doses   - c/w coreg, hydralazine, nifedipine  - c/w asa  - started Lovenox treatment dose x 48 hrs per cardio - 5/9/24  - started  Plavix 75 qd 5/9/24  - pan and echo results also d/w pt outpt cardiologist at [facility] dr. KAMARA. agree with medical management    Transaminitis   -Monitor, avoid hepatotoxins    ESRD on HD Tu/Th/Sat  Anemia of chronic disease  -Nephro following recc noted   -HD per nephro  -Monitor CBC, BMP    Hypothyroidism   -Continue synthroid    HLD  -Continue Lipitor     Code Status - DNR/DNI - son to bring MOLST  HCP - son updated, Imad Kayali  5/10/24  d/w dr. jam kamara cardio- cell [phone], office- [phone],     dispo- likely [facility], pending pt/ot eval and cardio clearance     time spent in e/m visit- 50 min  89 y/o F with PMH ESRD on HD Tu/Th/Sat, chronic diastolic CHF, HTN, anemia of chronic disease, HLD, hypothyroidism, depression, anxiety, hx of VTE, severe protein calorie malnutrition,  BIBEMS from [facility] for AMS, SOB, and HTN admitted for AMS, hypertensive emergency, and SOB r/o acute CHF exacerbation. On initial eval pt noted with tachypnea, diaphoresis, awake but not responsive, /125, HR 81, RR 25, O2 93% on RA, s/p lasix 40mg IVP x1, hydralazine 10mg IVP x1 with SBP improved to 168      NTEMI   metabolic encephalopathy and htn, admitted for Hypertensive emergency- BP improved   Acute on chronic diastolic CHF exacerbation ,ESRD on HD  -Cardiac monitoring  -Trop elevated - will trend   -CTH unremarkable for acute pathology  -Continue supplemental oxygen with nasal canula to maintain SpO2 > 92%  -Continuous pulse ox  -RVP negative   TTE Echo Complete w/o Contrast w/ Doppler (05.08.24 @ 10:37) >Mildly decreased global left ventricular systolic function Left ventricular ejection fraction, by visual Estimation, is 45 to 50%.  - cardio consulted- recc noted  ''overall suspect medical management due to advanced age, end stage renal disease and risk of complications with coronary angiogram. Discussed with patient's son who agrees with plan. In regards to HFmrEF, there is no clear indication for ARNI in this situation and SGLT2i and/or MRA may not be able to be tolerated due to end stage renal disease. Consider additional Hydralazine for BP control. Dialysis per Renal for fluid removal.''  - resp status improved post HD   - BP noted to be elevated overnight- will defer to renal to adjust doses   - c/w coreg, hydralazine, nifedipine  - c/w asa  - started Lovenox treatment dose x 48 hrs per cardio - 5/9/24  - started  Plavix 75 qd 5/9/24  - pan and echo results also d/w pt outpt cardiologist at [facility] dr. KAMARA. agree with medical management    Transaminitis   -Monitor, avoid hepatotoxins    ESRD on HD Tu/Th/Sat  Anemia of chronic disease  -Nephro following recc noted   -HD per nephro  -Monitor CBC, BMP    Hypothyroidism   -Continue synthroid    HLD  -Continue Lipitor     Code Status - DNR/DNI - son to bring MOLST  HCP - son updated, Corby Smith  5/10/24- can be d/c tomorrow [facility]   d/w dr. Moe kamara cardio- cell [phone], office- [phone],     dispo- likely [facility] per  pt/ot eval and cardio and renal  clearance   can be d/c tomorrow [facility] if she does well with HD today - ok with cardio to d/c in am if cleared by renal     time spent in e/m visit- 50 min  89 y/o F with PMH ESRD on HD Tu/Th/Sat, chronic diastolic CHF, HTN, anemia of chronic disease, HLD, hypothyroidism, depression, anxiety, hx of VTE, severe protein calorie malnutrition,  BIBEMS from [facility] for AMS, SOB, and HTN admitted for AMS, hypertensive emergency, and SOB r/o acute CHF exacerbation. On initial eval pt noted with tachypnea, diaphoresis, awake but not responsive, /125, HR 81, RR 25, O2 93% on RA, s/p lasix 40mg IVP x1, hydralazine 10mg IVP x1 with SBP improved to 168      NTEMI   metabolic encephalopathy and htn, admitted for Hypertensive emergency- BP improved   Acute on chronic diastolic CHF exacerbation ,ESRD on HD  -Cardiac monitoring  -Trop elevated   -CTH unremarkable for acute pathology  -Continue supplemental oxygen with nasal canula to maintain SpO2 > 92%  -RVP negative   TTE Echo Complete w/o Contrast w/ Doppler (05.08.24 @ 10:37) >Mildly decreased global left ventricular systolic function Left ventricular ejection fraction, by visual Estimation, is 45 to 50%.  - cardio consulted- recc noted  ''overall suspect medical management due to advanced age, end stage renal disease and risk of complications with coronary angiogram. Discussed with patient's son who agrees with plan. In regards to HFmrEF, there is no clear indication for ARNI in this situation and SGLT2i and/or MRA may not be able to be tolerated due to end stage renal disease. Consider additional Hydralazine for BP control. Dialysis per Renal for fluid removal.''  - resp status improved post HD   - BP noted to be elevated overnight- will defer to renal to adjust doses   - c/w coreg, hydralazine, nifedipine  - c/w asa   - started  Plavix 75 qd 5/9/24  - plan and echo results also d/w pt outpt cardiologist at [facility] dr. KAMARA. agree with medical management  - cardio suggesting ACEI/ARB FOR NSTEMI- defer to renal     Transaminitis - improved   -Monitor, avoid hepatotoxins    ESRD on HD Tu/Th/Sat  Anemia of chronic disease  -Nephro following recc noted   -HD per nephro  -Monitor CBC, BMP    Hypothyroidism   -Continue synthroid    HLD  -Continue Lipitor     Code Status - DNR/DNI - son to bring MOLST  HCP - son updated, Imcorinne Smith  5/10/24- can be d/c tomorrow [facility]   d/w dr. Moe kamara cardio- cell [phone], office- [phone],     dispo- likely [facility] per  pt/ot eval and cardio and renal  clearance   can be d/c tomorrow [facility] if she does well with HD today - ok with cardio to d/c in am if cleared by renal     time spent in e/m visit- 50 min

## 2024-05-10 NOTE — PROGRESS NOTE ADULT - SUBJECTIVE AND OBJECTIVE BOX
Follow up for :  hbp, elevated tn      SUBJ:feels ok, but notes bp elevated    PMH  ESRD on dialysis    HTN (hypertension)    HLD (hyperlipidemia)    Hypothyroidism    Anxiety and depression        MEDICATIONS  (STANDING):  aspirin  chewable 81 milliGRAM(s) Oral daily  atorvastatin 20 milliGRAM(s) Oral at bedtime  carvedilol 25 milliGRAM(s) Oral every 12 hours  clopidogrel Tablet 75 milliGRAM(s) Oral daily  hydrALAZINE 25 milliGRAM(s) Oral every 12 hours  levothyroxine 50 MICROGram(s) Oral daily  Nephro-luis 1 Tablet(s) Oral daily  NIFEdipine XL 60 milliGRAM(s) Oral daily  polyethylene glycol 3350 17 Gram(s) Oral daily  senna 1 Tablet(s) Oral at bedtime  sertraline 25 milliGRAM(s) Oral daily    MEDICATIONS  (PRN):        PHYSICAL EXAM:  Vital Signs Last 24 Hrs  T(C): 36.8 (10 May 2024 06:00), Max: 36.8 (09 May 2024 10:46)  T(F): 98.3 (10 May 2024 06:00), Max: 98.3 (10 May 2024 06:00)  HR: 69 (10 May 2024 06:00) (69 - 76)  BP: 168/67 (10 May 2024 06:00) (132/56 - 184/73)  BP(mean): --  RR: 18 (10 May 2024 06:00) (15 - 20)  SpO2: 95% (10 May 2024 06:00) (95% - 97%)    Parameters below as of 10 May 2024 06:00  Patient On (Oxygen Delivery Method): room air        GENERAL: NAD, well-groomed, well-developed  HEAD:  Atraumatic, Normocephalic  EYES:  conjunctiva and sclera clear  ENT: Moist mucous membranes,  NECK: Supple, No JVD, no bruits  catheter, right  CHEST/LUNG: Clear to auscultation bilaterally; No rales, rhonchi, wheezing, or rubs  HEART: Regular rate and rhythm; No murmurs, rubs, or gallops PMI non displaced.  ABDOMEN: Soft, Nontender, Nondistended; Bowel sounds present  EXTREMITIES: , No clubbing, cyanosis, or edema  SKIN: No rashes or lesions  NERVOUS SYSTEM:  Alert       TELEMETRY:  sinus    ECG:   < from: 12 Lead ECG (24 @ 06:00) >  Ventricular Rate 68 BPM    Atrial Rate 68 BPM    P-R Interval 196 ms    QRS Duration 78 ms    Q-T Interval 514 ms    QTC Calculation(Bazett) 546 ms    P Axis 43 degrees    R Axis 85 degrees    T Axis 216 degrees    Diagnosis Line Sinus rhythm with premature atrial complexes  T wave abnormality, consider inferior ischemia  T wave abnormality, consider anterolateral ischemia  Prolonged QT  Abnormal ECG  When compared with ECG of 08-MAY-2024 07:31,  T wave inversion now evident in Inferior leads  T wave inversion more evident in Anterolateral leads  QT has lengthened  Confirmed by HOLLY PIZARRO, NANI MARTINEZ () on 2024 8:28:30 AM    < end of copied text >  < from: Xray Chest 1 View-PORTABLE IMMEDIATE (24 @ 07:41) >    ACC: [id number] EXAM:  XR CHEST PORTABLE IMMED 1V   ORDERED BY: MACKENZIE NAYLOR     PROCEDURE DATE:  2024          INTERPRETATION:  AP chest on May 8, 2024 at 7:23 AM. Patient is short of   breath.    COMPARISON: None available.    Heart magnified by technique. COPD hyperexpansion of the lungs and large   right double-lumen jugular line are noted.    There are bibasilar effusions her chart small and central CHF. Chronic   right apical thickening is noted.    IMPRESSION: Probable heart enlargement. COPD. CHF. Large right jugular   line.    --- End of Report ---    < end of copied text >  < from: TTE Echo Complete w/o Contrast w/ Doppler (24 @ 10:37) >    ACC: [id number] EXAM:  ECHO TTE WO CON COMP W DOPP                          *** ADDENDUM***    TRANSTHORACIC ECHOCARDIOGRAM REPORT        Patient Name:   ALICE PEREA Patient Location: [address] Rec #:  [id number]      Accession #:      [id number]  Account #:      [id number]      Height:           63.0 in 160.0 cm  YOB: 1928      Weight:           125.0 lb 56.70 kg  Patient Age:    89 years      BSA:              1.58 m²  Patient Gender: F             BP:               232/124 mmHg      Date of Exam:        2024 10:37:32 AM  Sonographer:         Lane Ibarra  Referring Physician: RASHAUN SALINAS    Procedure:     2D Echo/Doppler/Color Doppler Complete.  Indications:   R06.00 - Dyspnea, unspecified  Diagnosis:     I27.20 - Pulmonary hypertension, unspecified  Study Details: Technically fair study.        2D AND M-MODE MEASUREMENTS (normal ranges within parentheses):  Left                 Normal   Aorta/Left            Normal  Ventricle:                    Atrium:  IVSd (2D):    1.49  (0.7-1.1) Aortic Root   3.20  (2.4-3.7)                 cm             (2D):          cm  LVPWd (2D):   1.45  (0.7-1.1) Aortic Root   2.90  (2.4-3.7)                 cm             (Mmode):       cm  LVIDd (2D):   4.12  (3.4-5.7) Left Atrium   3.90  (1.9-4.0)                 cm             (2D):          cm  LVIDs (2D):   3.50            Left Atrium   4.30  (1.9-4.0)                 cm             (Mmode):       cm  LV FS (2D):   15.0   (>25%)   LA Volume     26.9                  %        Index        ml/m²  Relative Wall 0.70   (<0.42)  Right Ventricle:  Thickness                     TAPSE:           1.76 cm    LV SYSTOLIC FUNCTION BY 2D PLANIMETRY (MOD):  EF-A4C View: 50.8 % EF-A2C View: 51.3 % EF-Biplane: 50 %    LV DIASTOLIC FUNCTION:  MV Peak E: 1.05 m/s Decel Time: 182 msec  MV Peak A: 1.23 m/s  E/A Ratio: 0.85    SPECTRAL DOPPLER ANALYSIS (where applicable):  Mitral Valve:  MV P1/2 Time: 52.78 msec  MV Area, PHT: 4.17 cm²    Aortic Valve: AoV Max Abdias: 1.50 m/s AoV Peak P.0 mmHg AoV Mean P.0 mmHg    LVOT Vmax: 0.70 m/s LVOT VTI: 0.191 m LVOT Diameter: 2.00 cm    AoV Area, Vmax: 1.47 cm² AoV Area, VTI: 1.63 cm² AoV Area, Vmn: 1.41 cm²  Ao VTI: 0.367  Tricuspid Valve and PA/RV Systolic Pressure: TR Max Velocity: 3.27 m/s RA   Pressure: 15 mmHg RVSP/PASP: 57.8 mmHg      PHYSICIAN INTERPRETATION:  Left Ventricle: The left ventricular internal cavity size is normal. Left   ventricular wall thickness is moderately increased.  Global LV systolic function was mildly decreased. Left ventricular   ejection fraction, by visual estimation, is 45 to 50%. Mild global left   ventricle hypokinesis with regional variation; the inferoseptal wall and   anteroseptal wall appear severely hypokinetic/akinetic. False tendon   visualized.  Right Ventricle: Normal right ventricular size and function.  Left Atrium: The left atrium is normal in size. LA volume Index is 26.9   ml/m2.  Right Atrium: The right atrium is normal in size.  Pericardium: A small pericardial effusion is present. The pericardial   effusion is globally located around the entire heart. Subcostal window   not well visualized. There is a small pleural effusion in the left   lateral region.  Mitral Valve: Mild thickening and calcification of theanterior and   posterior mitral valve leaflets. There is mild mitral annular   calcification. Mild mitral valve regurgitation is seen.  Tricuspid Valve: The tricuspid valve is normal in structure. Mild   tricuspid regurgitation is visualized. Estimated pulmonary artery   systolic pressure is 57.8 mmHg assuming a right atrial pressure of 15   mmHg, which is consistent with moderate pulmonary hypertension.  Aortic Valve: The aortic valve is trileaflet. No evidence of aortic valve   regurgitation isseen. Aortic valve leaflet calcification with restricted   leaflet opening. Mild aortic valve stenosis (peak velocity 1.5 m/s, mean   gradient 5 mmHg, calculated LILLY by continuity equation 1.6 cm^2, DI 0.5).  Pulmonic Valve: The pulmonic valve is normal. Mild pulmonic valve   regurgitation.  Aorta: The ascending aorta was not well visualized. The aortic arch was   not well visualized. Aortic root measured at Sinus of Valsalva is normal.  Venous: The inferior vena cava is abnormal. The inferior vena cava was   dilated, with respiratory size variation less than 50%.      Summary:   1. Mildly decreased global left ventricular systolic function.   2. Left ventricular ejection fraction, by visual estimation, is 45 to   50%.   3. Mild global left ventricle hypokinesis with regional variation; the   inferoseptal wall and anteroseptal wall appear severely   hypokinetic/akinetic. False tendon visualized.   4. Moderately increased LV wall thickness.   5. Normal left ventricular internal cavity size.   6. Normal right ventricular size and function.   7. The left atrium is normal in size.   8. The right atrium is normal in size.   9. Mild mitral annular calcification.  10. Mild thickening and calcification of the anterior and posterior   mitral valve leaflets.  11. Mild mitral valve regurgitation.  12. Mild tricuspid regurgitation.  13. Aortic valve leaflet calcification with restricted leaflet opening.   Mild aortic valve stenosis (peak velocity 1.5 m/s, mean gradient 5 mmHg,   calculated LILLY by continuity equation 1.6 cm^2, DI 0.5).  14. Mild pulmonic valve regurgitation.  15. Estimated pulmonary artery systolic pressure is 57.8 mmHg assuming a   right atrial pressure of 15 mmHg, which is consistent with moderate   pulmonary hypertension.  16. Small pericardial effusion.  17. Subcostal window not well visualized.    [id number] [facility] Leung MD Electronically signed on   2024 at 1:43:18 PM      < end of copied text >    LABS:                        12.0   5.71  )-----------( 185      ( 09 May 2024 07:59 )             37.0     05    139  |  101  |  48<H>  ----------------------------<  96  3.7   |  29  |  2.83<H>    Ca    8.4      09 May 2024 07:59  Phos  4.4       Mg     2.1         TPro  6.1  /  Alb  2.8<L>  /  TBili  0.5  /  DBili  x   /  AST  30  /  ALT  45  /  AlkPhos  74  -        Troponin I, High Sensitivity Result: 483.0 ng/L (24 @ 10:35)  Troponin I, High Sensitivity Result: 1237.6 ng/L (24 @ 15:30)        I&O's Summary    10 May 2024 07:01  -  10 May 2024 09:48  --------------------------------------------------------  IN: 0 mL / OUT: 400 mL / NET: -400 mL          RADIOLOGY & ADDITIONAL STUDIES:    ECHO:

## 2024-05-10 NOTE — PROGRESS NOTE ADULT - SUBJECTIVE AND OBJECTIVE BOX
89 y/o F with PMH ESRD on HD Tu/Th/Sat, chronic diastolic CHF, HTN, anemia of chronic disease, HLD, hypothyroidism, depression, anxiety, hx of VTE, severe protein calorie malnutrition,  [facility] admitted for AMS, hypertensive emergency, and SOB r/o acute CHF exacerbation.      seen at the bedside, no new complaints today.   no n/v, no sob, cp.    Vital Signs Last 24 Hrs  T(C): 36.8 (10 May 2024 06:00), Max: 36.8 (09 May 2024 10:46)  T(F): 98.3 (10 May 2024 06:00), Max: 98.3 (10 May 2024 06:00)  HR: 69 (10 May 2024 06:00) (69 - 76)  BP: 168/67 (10 May 2024 06:00) (132/56 - 184/73)  BP(mean): --  RR: 18 (10 May 2024 06:00) (15 - 20)  SpO2: 95% (10 May 2024 06:00) (95% - 97%)    Parameters below as of 10 May 2024 06:00  Patient On (Oxygen Delivery Method): room air      GENERAL- NAD  EAR/NOSE/MOUTH/THROAT - MMM  EYES- ELVIA, conjunctiva and Sclera clear  NECK- supple  RESPIRATORY-  clear to auscultation bilaterally, non laboured breathing  CARDIOVASCULAR - SIS2, RRR  GI - soft NT BS present  EXTREMITIES- no pedal edema  NEUROLOGY- no gross focal deficits  PSYCHIATRY- AAO X 2                  12.0                 139  | 29   | 48           5.71  >-----------< 185     ------------------------< 96                    37.0                 3.7  | 101  | 2.83                                         Ca 8.4   Mg 2.1   Ph 4.4      Labs reviewed:     CXR personally reviewed:     ECG reviewed and interpreted: < from: 12 Lead ECG (05.09.24 @ 06:00) >  Ventricular Rate 68 BPM    Atrial Rate 68 BPM    P-R Interval 196 ms    QRS Duration 78 ms    Q-T Interval 514 ms    QTC Calculation(Bazett) 546 ms    P Axis 43 degrees    R Axis 85 degrees    T Axis 216 degrees    Diagnosis Line Sinus rhythm with premature atrial complexes    < end of copied text >        MEDICATIONS  (STANDING):  aspirin  chewable 81 milliGRAM(s) Oral daily  atorvastatin 20 milliGRAM(s) Oral at bedtime  carvedilol 25 milliGRAM(s) Oral every 12 hours  clopidogrel Tablet 75 milliGRAM(s) Oral daily  enoxaparin Injectable 55 milliGRAM(s) SubCutaneous every 24 hours  hydrALAZINE 25 milliGRAM(s) Oral every 12 hours  levothyroxine 50 MICROGram(s) Oral daily  Nephro-luis 1 Tablet(s) Oral daily  NIFEdipine XL 60 milliGRAM(s) Oral daily  polyethylene glycol 3350 17 Gram(s) Oral daily  senna 1 Tablet(s) Oral at bedtime  sertraline 25 milliGRAM(s) Oral daily    MEDICATIONS  (PRN):   89 y/o F with PMH ESRD on HD Tu/Th/Sat, chronic diastolic CHF, HTN, anemia of chronic disease, HLD, hypothyroidism, depression, anxiety, hx of VTE, severe protein calorie malnutrition,  [facility] from [facility] for [facility]MS, SOB, and HTN admitted for AMS, hypertensive emergency, and SOB r/o acute CHF exacerbation.      seen at the bedside, no new complaints today.   no n/v, no sob, cp.  clinically appears comfortable     Vital Signs Last 24 Hrs  T(C): 36.8 (10 May 2024 06:00), Max: 36.8 (09 May 2024 10:46)  T(F): 98.3 (10 May 2024 06:00), Max: 98.3 (10 May 2024 06:00)  HR: 69 (10 May 2024 06:00) (69 - 76)  BP: 168/67 (10 May 2024 06:00) (132/56 - 184/73)  BP(mean): --  RR: 18 (10 May 2024 06:00) (15 - 20)  SpO2: 95% (10 May 2024 06:00) (95% - 97%)    Parameters below as of 10 May 2024 06:00  Patient On (Oxygen Delivery Method): room air      GENERAL- NAD  EAR/NOSE/MOUTH/THROAT - MMM  EYES- ELVIA, conjunctiva and Sclera clear  NECK- supple  RESPIRATORY-  clear to auscultation bilaterally, non laboured breathing  CARDIOVASCULAR - SIS2, RRR  GI - soft NT BS present  EXTREMITIES- no pedal edema  NEUROLOGY- no gross focal deficits  PSYCHIATRY- AAO X 2                  12.0                 139  | 29   | 48           5.71  >-----------< 185     ------------------------< 96                    37.0                 3.7  | 101  | 2.83                                         Ca 8.4   Mg 2.1   Ph 4.4      Labs reviewed:     CXR personally reviewed:     ECG reviewed and interpreted: < from: 12 Lead ECG (05.09.24 @ 06:00) >  Ventricular Rate 68 BPM    Atrial Rate 68 BPM    P-R Interval 196 ms    QRS Duration 78 ms    Q-T Interval 514 ms    QTC Calculation(Bazett) 546 ms    P Axis 43 degrees    R Axis 85 degrees    T Axis 216 degrees    Diagnosis Line Sinus rhythm with premature atrial complexes    < end of copied text >    < from: TTE Echo Complete w/o Contrast w/ Doppler (05.08.24 @ 10:37) > 1. Mildly decreased global left ventricular systolic function. Left ventricular ejection fraction, by visual estimation, is 45 to 50%. Mild global left ventricle hypokinesis with regional variation; the inferoseptal wall and anteroseptal wall appear severely hypokinetic/akinetic. False tendon visualized.        MEDICATIONS  (STANDING):  aspirin  chewable 81 milliGRAM(s) Oral daily  atorvastatin 20 milliGRAM(s) Oral at bedtime  carvedilol 25 milliGRAM(s) Oral every 12 hours  clopidogrel Tablet 75 milliGRAM(s) Oral daily  enoxaparin Injectable 55 milliGRAM(s) SubCutaneous every 24 hours  hydrALAZINE 25 milliGRAM(s) Oral every 12 hours  levothyroxine 50 MICROGram(s) Oral daily  Nephro-luis 1 Tablet(s) Oral daily  NIFEdipine XL 60 milliGRAM(s) Oral daily  polyethylene glycol 3350 17 Gram(s) Oral daily  senna 1 Tablet(s) Oral at bedtime  sertraline 25 milliGRAM(s) Oral daily    MEDICATIONS  (PRN):

## 2024-05-10 NOTE — DISCHARGE NOTE PROVIDER - NSDCCPCAREPLAN_GEN_ALL_CORE_FT
PRINCIPAL DISCHARGE DIAGNOSIS  Diagnosis: Hypertensive emergency  Assessment and Plan of Treatment: You were diagnosed with hypertensive emergency and NSTEMI. Cardiology recommended medical management. Your blood pressure medications have been adjusted. You were started on aspirin and plavix. Please follow up with your primary care doctor and your cardiologist. Notify staff at rehab center if you develop any new symptoms such as headache, chest pain, or difficulty breathing. Return to hospital for any new or worsening symptoms.      SECONDARY DISCHARGE DIAGNOSES  Diagnosis: Fluid overload  Assessment and Plan of Treatment:      PRINCIPAL DISCHARGE DIAGNOSIS  Diagnosis: Hypertensive emergency  Assessment and Plan of Treatment: You were diagnosed with hypertensive emergency and NSTEMI. Cardiology recommended medical management. Your blood pressure medications have been adjusted. You were started on aspirin and plavix. Please follow up with your primary care doctor and your cardiologist. Notify staff at rehab center if you develop any new symptoms such as headache, chest pain, or difficulty breathing. Return to hospital for any new or worsening symptoms.      SECONDARY DISCHARGE DIAGNOSES  Diagnosis: NSTEMI (non-ST elevation myocardial infarction)  Assessment and Plan of Treatment:

## 2024-05-10 NOTE — DISCHARGE NOTE PROVIDER - ATTENDING DISCHARGE PHYSICAL EXAMINATION:
Gen: pt sitting in chair in NAD  HENT: NC/AT, MMM  Lungs: nonlabored breathing, no resp distress  Abd: Soft nontender  MSK: Moves all ext, no edema to BLE  Neuro: Follows commands, sensation intact  Psych: A&Ox3, appropriate affect

## 2024-05-11 LAB
ALBUMIN SERPL ELPH-MCNC: 2.8 G/DL — LOW (ref 3.3–5)
ALP SERPL-CCNC: 71 U/L — SIGNIFICANT CHANGE UP (ref 40–120)
ALT FLD-CCNC: 35 U/L — SIGNIFICANT CHANGE UP (ref 10–45)
ANION GAP SERPL CALC-SCNC: 10 MMOL/L — SIGNIFICANT CHANGE UP (ref 5–17)
AST SERPL-CCNC: 27 U/L — SIGNIFICANT CHANGE UP (ref 10–40)
BILIRUB SERPL-MCNC: 0.3 MG/DL — SIGNIFICANT CHANGE UP (ref 0.2–1.2)
BUN SERPL-MCNC: 37 MG/DL — HIGH (ref 7–23)
CALCIUM SERPL-MCNC: 8.3 MG/DL — LOW (ref 8.4–10.5)
CHLORIDE SERPL-SCNC: 106 MMOL/L — SIGNIFICANT CHANGE UP (ref 96–108)
CO2 SERPL-SCNC: 28 MMOL/L — SIGNIFICANT CHANGE UP (ref 22–31)
CREAT SERPL-MCNC: 2.62 MG/DL — HIGH (ref 0.5–1.3)
EGFR: 16 ML/MIN/1.73M2 — LOW
GLUCOSE SERPL-MCNC: 96 MG/DL — SIGNIFICANT CHANGE UP (ref 70–99)
HCT VFR BLD CALC: 38.7 % — SIGNIFICANT CHANGE UP (ref 34.5–45)
HGB BLD-MCNC: 12.5 G/DL — SIGNIFICANT CHANGE UP (ref 11.5–15.5)
MCHC RBC-ENTMCNC: 29.8 PG — SIGNIFICANT CHANGE UP (ref 27–34)
MCHC RBC-ENTMCNC: 32.3 GM/DL — SIGNIFICANT CHANGE UP (ref 32–36)
MCV RBC AUTO: 92.1 FL — SIGNIFICANT CHANGE UP (ref 80–100)
NRBC # BLD: 0 /100 WBCS — SIGNIFICANT CHANGE UP (ref 0–0)
PLATELET # BLD AUTO: 197 K/UL — SIGNIFICANT CHANGE UP (ref 150–400)
POTASSIUM SERPL-MCNC: 4.2 MMOL/L — SIGNIFICANT CHANGE UP (ref 3.5–5.3)
POTASSIUM SERPL-SCNC: 4.2 MMOL/L — SIGNIFICANT CHANGE UP (ref 3.5–5.3)
PROT SERPL-MCNC: 6.2 G/DL — SIGNIFICANT CHANGE UP (ref 6–8.3)
RBC # BLD: 4.2 M/UL — SIGNIFICANT CHANGE UP (ref 3.8–5.2)
RBC # FLD: 15 % — HIGH (ref 10.3–14.5)
SODIUM SERPL-SCNC: 144 MMOL/L — SIGNIFICANT CHANGE UP (ref 135–145)
TROPONIN I, HIGH SENSITIVITY RESULT: 108.4 NG/L — HIGH
WBC # BLD: 5.09 K/UL — SIGNIFICANT CHANGE UP (ref 3.8–10.5)
WBC # FLD AUTO: 5.09 K/UL — SIGNIFICANT CHANGE UP (ref 3.8–10.5)

## 2024-05-11 PROCEDURE — 99233 SBSQ HOSP IP/OBS HIGH 50: CPT

## 2024-05-11 PROCEDURE — 99232 SBSQ HOSP IP/OBS MODERATE 35: CPT

## 2024-05-11 RX ORDER — METOPROLOL TARTRATE 50 MG
50 TABLET ORAL EVERY 24 HOURS
Refills: 0 | Status: DISCONTINUED | OUTPATIENT
Start: 2024-05-11 | End: 2024-05-13

## 2024-05-11 RX ORDER — METOPROLOL TARTRATE 50 MG
50 TABLET ORAL DAILY
Refills: 0 | Status: DISCONTINUED | OUTPATIENT
Start: 2024-05-11 | End: 2024-05-11

## 2024-05-11 RX ORDER — ENOXAPARIN SODIUM 100 MG/ML
30 INJECTION SUBCUTANEOUS EVERY 24 HOURS
Refills: 0 | Status: DISCONTINUED | OUTPATIENT
Start: 2024-05-11 | End: 2024-05-11

## 2024-05-11 RX ORDER — HYDRALAZINE HCL 50 MG
25 TABLET ORAL ONCE
Refills: 0 | Status: COMPLETED | OUTPATIENT
Start: 2024-05-11 | End: 2024-05-11

## 2024-05-11 RX ORDER — ATORVASTATIN CALCIUM 80 MG/1
80 TABLET, FILM COATED ORAL AT BEDTIME
Refills: 0 | Status: DISCONTINUED | OUTPATIENT
Start: 2024-05-11 | End: 2024-05-13

## 2024-05-11 RX ORDER — HEPARIN SODIUM 5000 [USP'U]/ML
5000 INJECTION INTRAVENOUS; SUBCUTANEOUS EVERY 8 HOURS
Refills: 0 | Status: DISCONTINUED | OUTPATIENT
Start: 2024-05-11 | End: 2024-05-13

## 2024-05-11 RX ADMIN — Medication 60 MILLIGRAM(S): at 05:24

## 2024-05-11 RX ADMIN — CARVEDILOL PHOSPHATE 25 MILLIGRAM(S): 80 CAPSULE, EXTENDED RELEASE ORAL at 05:24

## 2024-05-11 RX ADMIN — Medication 50 MILLIGRAM(S): at 18:02

## 2024-05-11 RX ADMIN — Medication 25 MILLIGRAM(S): at 05:24

## 2024-05-11 RX ADMIN — SERTRALINE 25 MILLIGRAM(S): 25 TABLET, FILM COATED ORAL at 11:01

## 2024-05-11 RX ADMIN — Medication 1 TABLET(S): at 11:01

## 2024-05-11 RX ADMIN — Medication 25 MILLIGRAM(S): at 21:29

## 2024-05-11 RX ADMIN — HEPARIN SODIUM 5000 UNIT(S): 5000 INJECTION INTRAVENOUS; SUBCUTANEOUS at 21:08

## 2024-05-11 RX ADMIN — Medication 81 MILLIGRAM(S): at 11:01

## 2024-05-11 RX ADMIN — Medication 50 MICROGRAM(S): at 05:24

## 2024-05-11 RX ADMIN — Medication 25 MILLIGRAM(S): at 17:11

## 2024-05-11 RX ADMIN — ATORVASTATIN CALCIUM 80 MILLIGRAM(S): 80 TABLET, FILM COATED ORAL at 21:08

## 2024-05-11 RX ADMIN — Medication 25 MILLIGRAM(S): at 23:51

## 2024-05-11 RX ADMIN — Medication 25 MILLIGRAM(S): at 22:39

## 2024-05-11 RX ADMIN — CLOPIDOGREL BISULFATE 75 MILLIGRAM(S): 75 TABLET, FILM COATED ORAL at 11:01

## 2024-05-11 RX ADMIN — HEPARIN SODIUM 5000 UNIT(S): 5000 INJECTION INTRAVENOUS; SUBCUTANEOUS at 13:04

## 2024-05-11 NOTE — PROGRESS NOTE ADULT - SUBJECTIVE AND OBJECTIVE BOX
Comfortable on RA    Vital Signs Last 24 Hrs  T(C): 36.3 (05-11-24 @ 16:51), Max: 36.7 (05-10-24 @ 21:23)  T(F): 97.3 (05-11-24 @ 16:51), Max: 98.1 (05-11-24 @ 05:12)  HR: 74 (05-11-24 @ 18:51) (67 - 75)  BP: 148/87 (05-11-24 @ 18:51) (108/46 - 185/83)  RR: 18 (05-11-24 @ 18:22) (15 - 23)  SpO2: 96% (05-11-24 @ 18:22) (94% - 97%)    I&O's Detail    10 May 2024 07:01  -  11 May 2024 07:00  --------------------------------------------------------  OUT:    Other (mL): 1000 mL    Voided (mL): 800 mL  Total OUT: 1800 mL    s1s2  b/l air entry  soft, ND  no edema                                       12.5   5.09  )-----------( 197      ( 11 May 2024 07:16 )             38.7     11 May 2024 07:16    144    |  106    |  37     ----------------------------<  96     4.2     |  28     |  2.62     Ca    8.3        11 May 2024 07:16    TPro  6.2    /  Alb  2.8    /  TBili  0.3    /  DBili  x      /  AST  27     /  ALT  35     /  AlkPhos  71     11 May 2024 07:16    LIVER FUNCTIONS - ( 11 May 2024 07:16 )  Alb: 2.8 g/dL / Pro: 6.2 g/dL / ALK PHOS: 71 U/L / ALT: 35 U/L / AST: 27 U/L / GGT: x           aspirin  chewable 81 milliGRAM(s) Oral daily  atorvastatin 80 milliGRAM(s) Oral at bedtime  clopidogrel Tablet 75 milliGRAM(s) Oral daily  heparin   Injectable 5000 Unit(s) SubCutaneous every 8 hours  hydrALAZINE 25 milliGRAM(s) Oral every 12 hours  levothyroxine 50 MICROGram(s) Oral daily  metoprolol succinate ER 50 milliGRAM(s) Oral every 24 hours  Nephro-luis 1 Tablet(s) Oral daily  NIFEdipine XL 60 milliGRAM(s) Oral daily  polyethylene glycol 3350 17 Gram(s) Oral daily  senna 1 Tablet(s) Oral at bedtime  sertraline 25 milliGRAM(s) Oral daily    A/P:    ESRD on HD MWF  Adm w/CHF iso NSTEMI  Cards f/u appr  Conservative approach   Next HD on Monday  TMP as tolerates     [phone]

## 2024-05-11 NOTE — PROGRESS NOTE ADULT - NUTRITIONAL ASSESSMENT
This patient has been assessed with a concern for Malnutrition and has been determined to have a diagnosis/diagnoses of Severe protein-calorie malnutrition.    This patient is being managed with:   Diet Renal Restrictions-  For patients receiving Renal Replacement - No Protein Restr No Conc K No Conc Phos Low Sodium  Supplement Feeding Modality:  Oral  Nepro Cans or Servings Per Day:  1       Frequency:  Daily  Entered: May  9 2024  1:27PM    Diet Renal Restrictions-  For patients receiving Renal Replacement - No Protein Restr No Conc K No Conc Phos Low Sodium  Entered: May  8 2024  9:44AM    The following pending diet order is being considered for treatment of Severe protein-calorie malnutrition:null

## 2024-05-11 NOTE — PROGRESS NOTE ADULT - SUBJECTIVE AND OBJECTIVE BOX
Patient is a 89y old  Female who presents with a chief complaint of SOB, HTN (11 May 2024 07:07)    Patient seen and examined at bedside. No overnight events reported. This morning patient had run of V-tach on monitor, now resolved. She denies chest pain, shortness of breath     ALLERGIES:  No Known Allergies    MEDICATIONS  (STANDING):  aspirin  chewable 81 milliGRAM(s) Oral daily  atorvastatin 80 milliGRAM(s) Oral at bedtime  carvedilol 25 milliGRAM(s) Oral every 12 hours  clopidogrel Tablet 75 milliGRAM(s) Oral daily  hydrALAZINE 25 milliGRAM(s) Oral every 12 hours  levothyroxine 50 MICROGram(s) Oral daily  Nephro-luis 1 Tablet(s) Oral daily  NIFEdipine XL 60 milliGRAM(s) Oral daily  polyethylene glycol 3350 17 Gram(s) Oral daily  senna 1 Tablet(s) Oral at bedtime  sertraline 25 milliGRAM(s) Oral daily    MEDICATIONS  (PRN):    Vital Signs Last 24 Hrs  T(F): 98.1 (11 May 2024 05:12), Max: 98.1 (11 May 2024 05:12)  HR: 67 (11 May 2024 05:12) (67 - 76)  BP: 149/59 (11 May 2024 05:12) (141/56 - 185/66)  RR: 15 (11 May 2024 05:12) (15 - 23)  SpO2: 96% (11 May 2024 05:12) (95% - 99%)    I&O's Summary  10 May 2024 07:01  -  11 May 2024 07:00  --------------------------------------------------------  IN: 0 mL / OUT: 1800 mL / NET: -1800 mL    PHYSICAL EXAM:  General: NAD, A/O x 3  ENT: No gross hearing impairment, Moist mucous membranes, no thrush  Neck: Supple, No JVD  Lungs: Clear to auscultation bilaterally, good air entry, non-labored breathing  Cardio: RRR, S1/S2, No murmur  Abdomen: Soft, Nontender, Nondistended; Bowel sounds present  Extremities: No calf tenderness, No cyanosis, No pitting edema  Psych: Appropriate mood and affect    LABS:                        12.5   5.09  )-----------( 197      ( 11 May 2024 07:16 )             38.7     05-11    144  |  106  |  37  ----------------------------<  96  4.2   |  28  |  2.62    Ca    8.3      11 May 2024 07:16  Phos  4.4     05-09  Mg     2.1     05-09    TPro  6.2  /  Alb  2.8  /  TBili  0.3  /  DBili  x   /  AST  27  /  ALT  35  /  AlkPhos  71  05-11    CARDIAC MARKERS ( 08 May 2024 15:30 )  x     / 1237.6 ng/L / x     / x     / x      CARDIAC MARKERS ( 08 May 2024 10:35 )  x     / 483.0 ng/L / x     / x     / x        Urinalysis Basic - ( 11 May 2024 07:16 )    Color: x / Appearance: x / SG: x / pH: x  Gluc: 96 mg/dL / Ketone: x  / Bili: x / Urobili: x   Blood: x / Protein: x / Nitrite: x   Leuk Esterase: x / RBC: x / WBC x   Sq Epi: x / Non Sq Epi: x / Bacteria: x    RADIOLOGY & ADDITIONAL TESTS:    Care Discussed with Consultants/Other Providers:    Patient is a 89y old  Female who presents with a chief complaint of SOB, HTN (11 May 2024 07:07)    Patient seen and examined at bedside. No overnight events reported. This morning patient had run of V-tach on monitor, now resolved. She denies chest pain, shortness of breath     ALLERGIES:  No Known Allergies    MEDICATIONS  (STANDING):  aspirin  chewable 81 milliGRAM(s) Oral daily  atorvastatin 80 milliGRAM(s) Oral at bedtime  carvedilol 25 milliGRAM(s) Oral every 12 hours  clopidogrel Tablet 75 milliGRAM(s) Oral daily  hydrALAZINE 25 milliGRAM(s) Oral every 12 hours  levothyroxine 50 MICROGram(s) Oral daily  Nephro-luis 1 Tablet(s) Oral daily  NIFEdipine XL 60 milliGRAM(s) Oral daily  polyethylene glycol 3350 17 Gram(s) Oral daily  senna 1 Tablet(s) Oral at bedtime  sertraline 25 milliGRAM(s) Oral daily    MEDICATIONS  (PRN):    Vital Signs Last 24 Hrs  T(F): 98.1 (11 May 2024 05:12), Max: 98.1 (11 May 2024 05:12)  HR: 67 (11 May 2024 05:12) (67 - 76)  BP: 149/59 (11 May 2024 05:12) (141/56 - 185/66)  RR: 15 (11 May 2024 05:12) (15 - 23)  SpO2: 96% (11 May 2024 05:12) (95% - 99%)    I&O's Summary  10 May 2024 07:01  -  11 May 2024 07:00  --------------------------------------------------------  IN: 0 mL / OUT: 1800 mL / NET: -1800 mL    PHYSICAL EXAM:  General: NAD, A/O x 3  ENT: No gross hearing impairment, Moist mucous membranes, no thrush  Neck: Supple, No JVD  Lungs: Clear to auscultation bilaterally, good air entry, non-labored breathing  Cardio: RRR, S1/S2, No murmur  Abdomen: Soft, Nontender, Nondistended; Bowel sounds present  Extremities: No calf tenderness, No cyanosis, No pitting edema  Psych: Appropriate mood and affect    LABS:                        12.5   5.09  )-----------( 197      ( 11 May 2024 07:16 )             38.7     05-11    144  |  106  |  37  ----------------------------<  96  4.2   |  28  |  2.62    Ca    8.3      11 May 2024 07:16  Phos  4.4     05-09  Mg     2.1     05-09    TPro  6.2  /  Alb  2.8  /  TBili  0.3  /  DBili  x   /  AST  27  /  ALT  35  /  AlkPhos  71  05-11    CARDIAC MARKERS ( 08 May 2024 15:30 )  x     / 1237.6 ng/L / x     / x     / x      CARDIAC MARKERS ( 08 May 2024 10:35 )  x     / 483.0 ng/L / x     / x     / x        Urinalysis Basic - ( 11 May 2024 07:16 )    Color: x / Appearance: x / SG: x / pH: x  Gluc: 96 mg/dL / Ketone: x  / Bili: x / Urobili: x   Blood: x / Protein: x / Nitrite: x   Leuk Esterase: x / RBC: x / WBC x   Sq Epi: x / Non Sq Epi: x / Bacteria: x    RADIOLOGY & ADDITIONAL TESTS:    Care Discussed with Consultants/Other Providers:   yes with cardio Dr. Colon

## 2024-05-11 NOTE — PROGRESS NOTE ADULT - TIME BILLING
Time spent includes direct patient care  (interview and examination of patient), discussion with other providers, support staff and/or patient's family members, review of medical records, ordering diagnostic tests and analyzing results, and documentation, excluding time spent in ACP discussions.

## 2024-05-11 NOTE — PROGRESS NOTE ADULT - ASSESSMENT
89 y/o F with PMH ESRD on HD Tu/Th/Sat, chronic diastolic CHF, HTN, anemia of chronic disease, HLD, hypothyroidism, depression, anxiety, hx of VTE, severe protein calorie malnutrition,  [facility]S from [facility] for [facility]MS, SOB, and HTN admitted for AMS, hypertensive emergency, and SOB r/o acute CHF exacerbation. On initial eval pt noted with tachypnea, diaphoresis, awake but not responsive, /125, HR 81, RR 25, O2 93% on RA, s/p lasix 40mg IVP x1, hydralazine 10mg IVP x1 with SBP improved to 168    #NSTEMI   #Hypertensive emergency - improved   #Acute on chronic diastolic CHF exacerbation   - TTE reviewed  - Appreciate cardiology recs - medical managment  - Continue aspirin, plavix (started 5/9), statin   - Continue coreg, hydralazine, nifedipine  - Add ACE/ARB? - low dose as per renal   - Plan and echo results also d/w pt outpt cardiologist at [facility] Dr Wetzel, agree with medical management    #ESRD on HD   #Anemia of chronic disease  - Last HD was yesterday, Friday 5/10  - Nephro following  - HD per nephro  - Monitor CBC, BMP    #Transaminitis - improved   - Monitor, avoid hepatotoxins    #Hypothyroidism   - Continue synthroid    #HLD  - Continue Lipitor - will increase dose as per cardiology    #DVT ppx  - lovenox DC'ed  - SCDS    Code Status - DNR/DNI    Dispo - Anticipate DC to [facility] today       HCP - son updated, Corby Smith  5/10/24- can be d/c tomorrow to [facility]        89 y/o F with PMH ESRD on HD Tu/Th/Sat, chronic diastolic CHF, HTN, anemia of chronic disease, HLD, hypothyroidism, depression, anxiety, hx of VTE, severe protein calorie malnutrition,  BIBEMS from [facility] for AMS, SOB, and HTN admitted for AMS, hypertensive emergency, and SOB r/o acute CHF exacerbation. On initial eval pt noted with tachypnea, diaphoresis, awake but not responsive, /125, HR 81, RR 25, O2 93% on RA, s/p lasix 40mg IVP x1, hydralazine 10mg IVP x1 with SBP improved to 168    #NSTEMI   #Hypertensive emergency - improved   #Acute on chronic diastolic CHF exacerbation   - TTE reviewed  - Appreciate cardiology recs - medical managment  - Continue aspirin, plavix (started 5/9), statin   - Continue coreg, hydralazine, nifedipine  - Add ACE/ARB? - low dose as per renal   - Plan and echo results also d/w pt outpt cardiologist at [facility] Dr Wetzel, agree with medical management    #ESRD on HD   #Anemia of chronic disease  - Last HD was yesterday, Friday 5/10  - Nephro following  - HD per nephro  - Monitor CBC, BMP    #Transaminitis - improved   - Monitor, avoid hepatotoxins    #Hypothyroidism   - Continue synthroid    #HLD  - Continue Lipitor - will increase dose as per cardiology    #DVT ppx  - lovenox DC'ed  - SCDS    Code Status - DNR/DNI    Dispo: Anticipate DC to [facility] once medically cleared - possibly tomorrow       HCP - son Corby Smith [phone]        89 y/o F with PMH ESRD on HD Tu/Th/Sat, chronic diastolic CHF, HTN, anemia of chronic disease, HLD, hypothyroidism, depression, anxiety, hx of VTE, severe protein calorie malnutrition,  B[facility]EMS from [facility] for AMS, SOB, and HTN admitted for AMS, hypertensive emergency, and SOB r/o acute CHF exacerbation. On initial eval pt noted with tachypnea, diaphoresis, awake but not responsive, /125, HR 81, RR 25, O2 93% on RA, s/p lasix 40mg IVP x1, hydralazine 10mg IVP x1 with SBP improved to 168    #NSTEMI   #Hypertensive emergency - improved   #Acute on chronic diastolic CHF exacerbation   #NSVT this morning 5/11  - TTE reviewed  - Appreciate cardiology recs - medical managment  - Due to arrhythmia, change beta-blockers. Stoparvedilol and start metoprolol succinate 50 mg daily.  - Continue dual antiplatelet therapy.  - Continue high intensity statin.  - Continue hydralazine, nifedipine  - Follow up repeat trop   - Plan and echo results also d/w pt outpt cardiologist at [facility] Dr Wetzel, agree with medical management    #ESRD on HD   #Anemia of chronic disease  - Last HD was yesterday, Friday 5/10  - Nephro following  - HD per nephro  - Monitor CBC, BMP    #Transaminitis - improved   - Monitor, avoid hepatotoxins    #Hypothyroidism   - Continue synthroid    #HLD  - Continue Lipitor - will increase dose as per cardiology    #DVT ppx  - Lovenox    Code Status - DNR/DNI    Dispo: Anticipate DC to [facility] once medically cleared - possibly tomorrow       HCP - son Corby Smith [phone]        89 y/o F with PMH ESRD on HD Tu/Th/Sat, chronic diastolic CHF, HTN, anemia of chronic disease, HLD, hypothyroidism, depression, anxiety, hx of VTE, severe protein calorie malnutrition,  B[facility]EMS from [facility] for AMS, SOB, and HTN admitted for AMS, hypertensive emergency, and SOB r/o acute CHF exacerbation. On initial eval pt noted with tachypnea, diaphoresis, awake but not responsive, /125, HR 81, RR 25, O2 93% on RA, s/p lasix 40mg IVP x1, hydralazine 10mg IVP x1 with SBP improved to 168    #NSTEMI   #Hypertensive emergency - improved   #Acute on chronic diastolic CHF exacerbation   #NSVT this morning 5/11  - TTE reviewed  - Appreciate cardiology recs - medical managment  - Due to arrhythmia, change beta-blockers. Stop carvedilol and start metoprolol succinate 50 mg daily.  - Continue dual antiplatelet therapy.  - Continue high intensity statin.  - Continue hydralazine, nifedipine  - Follow up repeat trop   - Plan and echo results also d/w pt outpt cardiologist at [facility] Dr Wetzel, agree with medical management    #ESRD on HD   #Anemia of chronic disease  - Last HD was yesterday, Friday 5/10  - Nephro following  - HD per nephro  - Monitor CBC, BMP    #Transaminitis - improved   - Monitor, avoid hepatotoxins    #Hypothyroidism   - Continue synthroid    #HLD  - Continue Lipitor - will increase dose as per cardiology    #DVT ppx  - heparin    Code Status - DNR/DNI    Dispo: Anticipate DC to [facility] once medically cleared - possibly tomorrow     5/11: Updated HCP - son Corby Smith [phone]        89 y/o F with PMH ESRD on HD Tu/Th/Sat, chronic diastolic CHF, HTN, anemia of chronic disease, HLD, hypothyroidism, depression, anxiety, hx of VTE, severe protein calorie malnutrition,  BIBEMS from [facility] for AMS, SOB, and HTN admitted for AMS, hypertensive emergency, and SOB r/o acute CHF exacerbation. On initial eval pt noted with tachypnea, diaphoresis, awake but not responsive, /125, HR 81, RR 25, O2 93% on RA, s/p lasix 40mg IVP x1, hydralazine 10mg IVP x1 with SBP improved to 168    #NSTEMI   #Hypertensive emergency - improved   #NSVT this morning 5/11  - pt with Hx chronic diastolic CHF, NOT in exacerbation; pt SOB 2/2 to above  - TTE reviewed  - Appreciate cardiology recs - medical managment  - Due to arrhythmia, change beta-blockers. Stop carvedilol and start metoprolol succinate 50 mg daily.  - Continue dual antiplatelet therapy.  - Continue high intensity statin.  - Continue hydralazine, nifedipine  - Follow up repeat trop   - Plan and echo results also d/w pt outpt cardiologist at [facility] Dr Wetzel, agree with medical management    #ESRD on HD   #Anemia of chronic disease  - Last HD was yesterday, Friday 5/10  - Nephro following  - HD per nephro  - Monitor CBC, BMP    #Transaminitis - improved   - Monitor, avoid hepatotoxins    #Hypothyroidism   - Continue synthroid    #HLD  - Continue Lipitor - will increase dose as per cardiology    #DVT ppx  - heparin    Code Status - DNR/DNI    Dispo: Anticipate DC to [facility] once medically cleared - possibly tomorrow     5/11: Updated HCP - son Corby Smith [phone]

## 2024-05-11 NOTE — PROGRESS NOTE ADULT - SUBJECTIVE AND OBJECTIVE BOX
SUBJ:  Patient is a 89y old  Female who presents with a chief complaint of SOB, HTN (11 May 2024 07:07)  Patient seen and examined.  Chart is reviewed.  Case was discussed with Dr. Rosales.  Patient in bed, sleeping easily arousable.  No cardiopulmonary distress.      PAST MEDICAL & SURGICAL HISTORY:  ESRD on dialysis      HTN (hypertension)      HLD (hyperlipidemia)      Hypothyroidism      Anxiety and depression          MEDICATIONS  (STANDING):  aspirin  chewable 81 milliGRAM(s) Oral daily  atorvastatin 80 milliGRAM(s) Oral at bedtime  carvedilol 25 milliGRAM(s) Oral every 12 hours  clopidogrel Tablet 75 milliGRAM(s) Oral daily  hydrALAZINE 25 milliGRAM(s) Oral every 12 hours  levothyroxine 50 MICROGram(s) Oral daily  Nephro-luis 1 Tablet(s) Oral daily  NIFEdipine XL 60 milliGRAM(s) Oral daily  polyethylene glycol 3350 17 Gram(s) Oral daily  senna 1 Tablet(s) Oral at bedtime  sertraline 25 milliGRAM(s) Oral daily    MEDICATIONS  (PRN):          Vital Signs Last 24 Hrs  T(C): 36.6 (11 May 2024 09:33), Max: 36.7 (10 May 2024 21:23)  T(F): 97.8 (11 May 2024 09:33), Max: 98.1 (11 May 2024 05:12)  HR: 69 (11 May 2024 09:33) (67 - 76)  BP: 108/46 (11 May 2024 09:33) (108/46 - 185/66)  BP(mean): --  RR: 18 (11 May 2024 09:33) (15 - 23)  SpO2: 95% (11 May 2024 09:33) (95% - 99%)    Parameters below as of 11 May 2024 09:33  Patient On (Oxygen Delivery Method): room air        REVIEW OF SYSTEMS:  CONSTITUTIONAL: Fatigue  RESPIRATORY: No cough, wheezing, chills or hemoptysis; No shortness of breath  CARDIOVASCULAR: No chest pain or chest pressure.  No shortness of breath or dyspnea on exertion.  No palpitations, dizziness, light headedness, syncope or near syncope.  No edema, no orthopnea.   NEUROLOGICAL: No headaches, memory loss, loss of strength, numbness, or tremors      PHYSICAL EXAM  Constitutional:  WDWN. No acute distress  HEENT: normocephalic, atraumatic.  PERRLA. EOMI  Neck : No JVD. no carotid bruits  Lungs:  Decreased breath sounds at both bases.  Heart:  S1 and S2. No S3, S4. II/VI systolic murmur.  Abdomen:  soft, non tender.  Extremities: No clubbing, cyanoisis or edema  Nuerologic:  A+O x 3. No focal deficits  Skin:  no rashes        LABS:                        12.5   5.09  )-----------( 197      ( 11 May 2024 07:16 )             38.7     05-11    144  |  106  |  37<H>  ----------------------------<  96  4.2   |  28  |  2.62<H>    Ca    8.3<L>      11 May 2024 07:16    TPro  6.2  /  Alb  2.8<L>  /  TBili  0.3  /  DBili  x   /  AST  27  /  ALT  35  /  AlkPhos  71  05-11  I&O's Summary    10 May 2024 07:01  -  11 May 2024 07:00  --------------------------------------------------------  IN: 0 mL / OUT: 1800 mL / NET: -1800 mL    < from: TTE Echo Complete w/o Contrast w/ Doppler (05.08.24 @ 10:37) >  Summary:   1. Mildly decreased global left ventricular systolic function.   2. Left ventricular ejection fraction, by visual estimation, is 45 to   50%.   3. Mild global left ventricle hypokinesis with nakia< from: 12 Lead ECG (05.10.24 @ 12:26) >  Diagnosis Line Normal sinus rhythm  Right axis deviation  ST and T wave abnormality, consider inferior ischemia  ST and T wave abnormality, consider anterior ischemia  Prolonged QT  Abnormal ECG  When compared with ECG of 10-MAY-2024 12:26,  No significant change was found    < end of copied text >  onal variation; the   inferoseptal wall and anteroseptal wall appear severely   hypokinetic/akinetic. False tendon visualized.   4. Moderately increased LV wall thickness.   5. Normal left ventricular internal cavity size.   6. Normal right ventricular size and function.   7. The left atrium is normal in size.   8. The right atrium is normal in size.   9. Mild mitral annular calcification.  10. Mild thickening and calcification of the anterior and posterior   mitral valve leaflets.  11. Mild mitral valve regurgitation.  12. Mild tricuspid regurgitation.  13. Aortic valve leaflet calcification with restricted leaflet opening.   Mild aortic valve stenosis (peak velocity 1.5 m/s, mean gradient 5 mmHg,   calculated LILLY by continuity equation 1.6 cm^2, DI 0.5).  14. Mild pulmonic valve regurgitation.  15. Estimated pulmonary artery systolic pressure is 57.8 mmHg assuming a   right atrial pressure of 15 mmHg, which is consistent with moderate   pulmonary hypertension.  16. Small pericardial effusion.  17. Subcostal window not well visualized.      < end of copied text >

## 2024-05-11 NOTE — PROGRESS NOTE ADULT - ASSESSMENT
89-year-old woman admitted with altered mental status and complaints of dyspnea.  In ED, found to have hypertensive emergency with elevated troponin.  EKG with T wave abnormalities.  Echocardiogram demonstrating regional wall motion abnormalities.  Telemetry monitoring demonstrating sinus rhythm and an episode of nonsustained VT earlier today.  Findings above consistent with NSTEMI.  Medical issues include end-stage renal disease on hemodialysis, hypertension and hyperlipidemia.    Recommendations  -Due to arrhythmia, reasonable to change beta-blockers.  Start carvedilol and start metoprolol succinate 50 mg daily.  -Continue dual antiplatelet therapy.  -Continue high intensity statin.  -Dialysis as per renal.  -Discussed with patient and with primary team.     89-year-old woman admitted with altered mental status and complaints of dyspnea.  In ED, found to have hypertensive emergency with elevated troponin.  EKG with T wave abnormalities.  Echocardiogram demonstrating regional wall motion abnormalities.  Telemetry monitoring demonstrating sinus rhythm and an episode of nonsustained VT earlier today.  Findings above consistent with NSTEMI.  Medical issues include end-stage renal disease on hemodialysis, hypertension and hyperlipidemia.    Recommendations  -Due to arrhythmia, reasonable to change beta-blockers.  Stop carvedilol and start metoprolol succinate 50 mg daily.  -Continue dual antiplatelet therapy.  -Continue high intensity statin.  -Dialysis as per renal.  -Discussed with patient and with primary team.

## 2024-05-12 LAB
ANION GAP SERPL CALC-SCNC: 12 MMOL/L — SIGNIFICANT CHANGE UP (ref 5–17)
BUN SERPL-MCNC: 63 MG/DL — HIGH (ref 7–23)
CALCIUM SERPL-MCNC: 8.2 MG/DL — LOW (ref 8.4–10.5)
CHLORIDE SERPL-SCNC: 103 MMOL/L — SIGNIFICANT CHANGE UP (ref 96–108)
CO2 SERPL-SCNC: 26 MMOL/L — SIGNIFICANT CHANGE UP (ref 22–31)
CREAT SERPL-MCNC: 3.99 MG/DL — HIGH (ref 0.5–1.3)
EGFR: 10 ML/MIN/1.73M2 — LOW
GLUCOSE SERPL-MCNC: 166 MG/DL — HIGH (ref 70–99)
MAGNESIUM SERPL-MCNC: 1.9 MG/DL — SIGNIFICANT CHANGE UP (ref 1.6–2.6)
NT-PROBNP SERPL-SCNC: HIGH PG/ML (ref 0–300)
PHOSPHATE SERPL-MCNC: 5.6 MG/DL — HIGH (ref 2.5–4.5)
POTASSIUM SERPL-MCNC: 4.4 MMOL/L — SIGNIFICANT CHANGE UP (ref 3.5–5.3)
POTASSIUM SERPL-SCNC: 4.4 MMOL/L — SIGNIFICANT CHANGE UP (ref 3.5–5.3)
SODIUM SERPL-SCNC: 141 MMOL/L — SIGNIFICANT CHANGE UP (ref 135–145)

## 2024-05-12 PROCEDURE — 99233 SBSQ HOSP IP/OBS HIGH 50: CPT

## 2024-05-12 PROCEDURE — 99232 SBSQ HOSP IP/OBS MODERATE 35: CPT

## 2024-05-12 RX ORDER — HYDRALAZINE HCL 50 MG
50 TABLET ORAL EVERY 8 HOURS
Refills: 0 | Status: DISCONTINUED | OUTPATIENT
Start: 2024-05-12 | End: 2024-05-13

## 2024-05-12 RX ORDER — LABETALOL HCL 100 MG
100 TABLET ORAL ONCE
Refills: 0 | Status: COMPLETED | OUTPATIENT
Start: 2024-05-12 | End: 2024-05-12

## 2024-05-12 RX ADMIN — ATORVASTATIN CALCIUM 80 MILLIGRAM(S): 80 TABLET, FILM COATED ORAL at 21:39

## 2024-05-12 RX ADMIN — Medication 50 MICROGRAM(S): at 05:15

## 2024-05-12 RX ADMIN — Medication 100 MILLIGRAM(S): at 06:57

## 2024-05-12 RX ADMIN — Medication 50 MILLIGRAM(S): at 14:24

## 2024-05-12 RX ADMIN — Medication 50 MILLIGRAM(S): at 17:26

## 2024-05-12 RX ADMIN — Medication 50 MILLIGRAM(S): at 21:39

## 2024-05-12 RX ADMIN — CLOPIDOGREL BISULFATE 75 MILLIGRAM(S): 75 TABLET, FILM COATED ORAL at 11:29

## 2024-05-12 RX ADMIN — Medication 81 MILLIGRAM(S): at 11:29

## 2024-05-12 RX ADMIN — HEPARIN SODIUM 5000 UNIT(S): 5000 INJECTION INTRAVENOUS; SUBCUTANEOUS at 14:24

## 2024-05-12 RX ADMIN — Medication 100 MILLIGRAM(S): at 00:54

## 2024-05-12 RX ADMIN — Medication 1 TABLET(S): at 11:29

## 2024-05-12 RX ADMIN — HEPARIN SODIUM 5000 UNIT(S): 5000 INJECTION INTRAVENOUS; SUBCUTANEOUS at 05:15

## 2024-05-12 RX ADMIN — Medication 25 MILLIGRAM(S): at 05:15

## 2024-05-12 RX ADMIN — SERTRALINE 25 MILLIGRAM(S): 25 TABLET, FILM COATED ORAL at 11:29

## 2024-05-12 RX ADMIN — Medication 60 MILLIGRAM(S): at 05:16

## 2024-05-12 RX ADMIN — HEPARIN SODIUM 5000 UNIT(S): 5000 INJECTION INTRAVENOUS; SUBCUTANEOUS at 21:39

## 2024-05-12 RX ADMIN — SENNA PLUS 1 TABLET(S): 8.6 TABLET ORAL at 21:39

## 2024-05-12 NOTE — PROGRESS NOTE ADULT - ASSESSMENT
89-year-old woman admitted with altered mental status and complaints of dyspnea.  In ED, found to have hypertensive emergency with elevated troponin.  Status post episode of elevated blood pressure earlier this morning, responsive to labetalol.  EKG with T wave abnormalities.  Echocardiogram demonstrating regional wall motion abnormalities.  Telemetry monitoring yesterday demonstrating sinus rhythm and an episode of nonsustained ventricular tachycardia.  Findings above consistent with NSTEMI.  Medical issues include end-stage renal disease on hemodialysis, hypertension and hyperlipidemia.    Recommendations  -Increase hydralazine 50 mg every 8 hours, she was taking this prior to admission.  -Continue metoprolol succinate 50 mg daily.  -Continue nifedipine XL 60 mg daily.  -Continue dual antiplatelet therapy.  -Continue high-dose statin.  -Dialysis as per renal.  -Advance activity as tolerable, discharge planning in progress.  -Discussed with patient and with primary care team.

## 2024-05-12 NOTE — CHART NOTE - NSCHARTNOTEFT_GEN_A_CORE
BP 180s/110s.   I evaluated patient at beside. Denies headache, dizziness, vision  change, nausea, chest pain, SOB.   Hydralazine 25mg PO given.
BP this /94. Patient to receive AM doses of hydralazine and nifedipine and will recheck

## 2024-05-12 NOTE — PROGRESS NOTE ADULT - SUBJECTIVE AND OBJECTIVE BOX
Comfortable on RA    Vital Signs Last 24 Hrs  T(C): 36.4 (05-12-24 @ 17:13), Max: 36.9 (05-12-24 @ 10:12)  T(F): 97.6 (05-12-24 @ 17:13), Max: 98.4 (05-12-24 @ 10:12)  HR: 74 (05-12-24 @ 17:13) (70 - 80)  BP: 148/60 (05-12-24 @ 17:13) (110/84 - 199/94)  RR: 15 (05-12-24 @ 17:13) (15 - 17)  SpO2: 99% (05-12-24 @ 17:13) (94% - 99%)    s1s2  b/l air entry  soft, ND  no edema                                               12.5   5.09  )-----------( 197      ( 11 May 2024 07:16 )             38.7     12 May 2024 11:48    141    |  103    |  63     ----------------------------<  166    4.4     |  26     |  3.99     Ca    8.2        12 May 2024 11:48  Phos  5.6       12 May 2024 11:48  Mg     1.9       12 May 2024 11:48    TPro  6.2    /  Alb  2.8    /  TBili  0.3    /  DBili  x      /  AST  27     /  ALT  35     /  AlkPhos  71     11 May 2024 07:16    LIVER FUNCTIONS - ( 11 May 2024 07:16 )  Alb: 2.8 g/dL / Pro: 6.2 g/dL / ALK PHOS: 71 U/L / ALT: 35 U/L / AST: 27 U/L / GGT: x           aspirin  chewable 81 milliGRAM(s) Oral daily  atorvastatin 80 milliGRAM(s) Oral at bedtime  clopidogrel Tablet 75 milliGRAM(s) Oral daily  heparin   Injectable 5000 Unit(s) SubCutaneous every 8 hours  hydrALAZINE 50 milliGRAM(s) Oral every 8 hours  levothyroxine 50 MICROGram(s) Oral daily  metoprolol succinate ER 50 milliGRAM(s) Oral every 24 hours  Nephro-luis 1 Tablet(s) Oral daily  NIFEdipine XL 60 milliGRAM(s) Oral daily  polyethylene glycol 3350 17 Gram(s) Oral daily  senna 1 Tablet(s) Oral at bedtime  sertraline 25 milliGRAM(s) Oral daily    A/P:    ESRD on HD MWF  Adm w/CHF iso NSTEMI  Cards f/u appr  Conservative approach   Next HD on Monday  TMP as tolerates   Continue current BP meds    [phone]

## 2024-05-12 NOTE — PROGRESS NOTE ADULT - ASSESSMENT
89 y/o F with PMH ESRD on HD Tu/Th/Sat, chronic diastolic CHF, HTN, anemia of chronic disease, HLD, hypothyroidism, depression, anxiety, hx of VTE, severe protein calorie malnutrition,  [facility]EMS from [facility] for AMS, SOB, and HTN admitted for AMS, hypertensive emergency, and SOB r/o acute CHF exacerbation. On initial eval pt noted with tachypnea, diaphoresis, awake but not responsive, /125, HR 81, RR 25, O2 93% on RA, s/p lasix 40mg IVP x1, hydralazine 10mg IVP x1 with SBP improved to 168    #NSTEMI   #Hypertensive emergency - improved   #Acute on chronic diastolic CHF exacerbation   #NSVT morning 5/11  - TTE reviewed  - Appreciate cardiology recs - medical managment  - Due to arrhythmia, change beta-blockers. Stop carvedilol and start metoprolol succinate 50 mg daily.  - Continue dual antiplatelet therapy.  - Continue high intensity statin.  - Continue hydralazine, nifedipine  - Follow up repeat trop   - Plan and echo results also d/w pt outpt cardiologist at [facility] Dr Wetzel, agree with medical management  - Needed labetalol 100mg this Am for elevated BP  - lasix?    #ESRD on HD   #Anemia of chronic disease  - Last HD was Friday 5/10  - Nephro following  - HD per nephro  - Monitor CBC, BMP    #Transaminitis - improved   - Monitor, avoid hepatotoxins    #Hypothyroidism   - Continue synthroid    #HLD  - Continue Lipitor - will increase dose as per cardiology    #DVT ppx  - heparin    Code Status - DNR/DNI    Dispo: Anticipate DC to [facility] once medically cleared - possibly tomorrow     5/11: Updated HCP - son Corby Smith [phone]        89 y/o F with PMH ESRD on HD Tu/Th/Sat, chronic diastolic CHF, HTN, anemia of chronic disease, HLD, hypothyroidism, depression, anxiety, hx of VTE, severe protein calorie malnutrition,  BIBEMS from [facility] for AMS, SOB, and HTN admitted for AMS, hypertensive emergency, and SOB r/o acute CHF exacerbation. On initial eval pt noted with tachypnea, diaphoresis, awake but not responsive, /125, HR 81, RR 25, O2 93% on RA, s/p lasix 40mg IVP x1, hydralazine 10mg IVP x1 with SBP improved to 168    #NSTEMI   #Hypertensive emergency   #Acute on chronic diastolic CHF exacerbation   #NSVT morning 5/11  - TTE reviewed  - Appreciate cardiology recs - medical managment  - Due to arrhythmia, changed beta-blockers. Stopped carvedilol and started metoprolol succinate 50 mg daily on 5/12  - Continue dual antiplatelet therapy  - Continue high intensity statin  - Continue hydralazine, nifedipine  - Overnight required additional hydralazine 25 mg PO x3, labetalol 100 mg PO x2 due to elevated BP - will d/w cardiology today     #ESRD on HD   #Anemia of chronic disease  - Last HD was Friday 5/10  - Nephro following  - HD per nephro  - Monitor CBC, BMP    #Transaminitis - improved   - Monitor, avoid hepatotoxins    #Hypothyroidism   - Continue synthroid    #HLD  - Continue Lipitor - will increase dose as per cardiology    #DVT ppx  - heparin    Code Status - DNR/DNI    Dispo: Anticipate DC to [facility] once medically cleared - possibly tomorrow     5/11: Updated HCP - son Corby Smith [phone]        89 y/o F with PMH ESRD on HD Tu/Th/Sat, chronic diastolic CHF, HTN, anemia of chronic disease, HLD, hypothyroidism, depression, anxiety, hx of VTE, severe protein calorie malnutrition,  BIBEMS from [facility] for AMS, SOB, and HTN admitted for AMS, hypertensive emergency, and SOB r/o acute CHF exacerbation. On initial eval pt noted with tachypnea, diaphoresis, awake but not responsive, /125, HR 81, RR 25, O2 93% on RA, s/p lasix 40mg IVP x1, hydralazine 10mg IVP x1 with SBP improved to 168    #NSTEMI   #Hypertensive emergency   #Acute on chronic diastolic CHF exacerbation   #NSVT morning 5/11  - TTE reviewed  - Appreciate cardiology recs - medical management  - Due to arrhythmia, changed beta-blockers. Stopped carvedilol and started metoprolol succinate 50 mg daily on 5/12  - Continue nifedipine   - Continue hydralazine - increase to 50 mg q8h  - Continue dual antiplatelet therapy  - Continue high intensity statin  - Overnight required additional hydralazine 25 mg PO x3, labetalol 100 mg PO x2 due to elevated BP - hydralazine now increased     #ESRD on HD   #Anemia of chronic disease  - Last HD was Friday 5/10  - Nephro following  - HD per nephro  - Monitor CBC, BMP    #Transaminitis - improved   - Monitor, avoid hepatotoxins    #Hypothyroidism   - Continue synthroid    #HLD  - Continue Lipitor - will increase dose as per cardiology    #DVT ppx  - heparin    Code Status - DNR/DNI    Dispo: Anticipate DC to [facility] once medically cleared - possibly tomorrow     5/12: Updated HCP - son Corby Smith [phone]        89 y/o F with PMH ESRD on HD Tu/Th/Sat, chronic diastolic CHF, HTN, anemia of chronic disease, HLD, hypothyroidism, depression, anxiety, hx of VTE, severe protein calorie malnutrition,  B[facility]EMS from [facility] for AMS, SOB, and HTN admitted for AMS, hypertensive emergency, and SOB r/o acute CHF exacerbation. On initial eval pt noted with tachypnea, diaphoresis, awake but not responsive, /125, HR 81, RR 25, O2 93% on RA, s/p lasix 40mg IVP x1, hydralazine 10mg IVP x1 with SBP improved to 168    #NSTEMI   #Hypertensive emergency   #NSVT morning 5/11  - pt with Hx chronic diastolic CHF, NOT in exacerbation; pt SOB 2/2 to above, not fluid overload  - TTE reviewed  - Appreciate cardiology recs - medical management  - Due to arrhythmia, changed beta-blockers. Stopped carvedilol and started metoprolol succinate 50 mg daily on 5/12  - Continue nifedipine   - Continue hydralazine - increase to 50 mg q8h  - Continue dual antiplatelet therapy  - Continue high intensity statin  - Overnight required additional hydralazine 25 mg PO x3, labetalol 100 mg PO x2 due to elevated BP - hydralazine now increased     #ESRD on HD   #Anemia of chronic disease  - Last HD was Friday 5/10  - Nephro following  - HD per nephro  - Monitor CBC, BMP    #Transaminitis - improved   - Monitor, avoid hepatotoxins    #Hypothyroidism   - Continue synthroid    #HLD  - Continue Lipitor - will increase dose as per cardiology    #DVT ppx  - heparin    Code Status - DNR/DNI    Dispo: Anticipate DC to [facility] once medically cleared - possibly tomorrow     5/12: Updated HCP - son Corby Smith [phone]

## 2024-05-12 NOTE — PROGRESS NOTE ADULT - SUBJECTIVE AND OBJECTIVE BOX
SUBJ:  Patient is a 89y old  Female who presents with a chief complaint of SOB, HTN (12 May 2024 07:07)  The patient is seen and examined.  Chart is reviewed.  Patient sleeping, arousable, no distress.      PAST MEDICAL & SURGICAL HISTORY:  ESRD on dialysis      HTN (hypertension)      HLD (hyperlipidemia)      Hypothyroidism      Anxiety and depression      Home Medications:  aspirin 81 mg oral tablet, chewable: 1 tab(s) chewed once a day (08 May 2024 09:35)  carvedilol 25 mg oral tablet: 1 tab(s) orally every 12 hours (08 May 2024 09:36)  Ferretts Iron 325 mg (106 mg elemental iron) oral tablet: 1 tab(s) orally once a day (08 May 2024 09:36)  hydrALAZINE 50 mg oral tablet: 1 tab(s) orally every 8 hours (08 May 2024 09:37)  Lipitor 20 mg oral tablet: 1 tab(s) orally once a day (at bedtime) (08 May 2024 09:35)  Nephro-Luis oral tablet: 1 tab(s) orally once a day (08 May 2024 09:38)  NIFEdipine 60 mg oral tablet, extended release: 1 tab(s) orally once a day (08 May 2024 09:38)  sertraline 25 mg oral tablet: 1 tab(s) orally once a day (08 May 2024 09:39)  Synthroid 50 mcg (0.05 mg) oral tablet: 1 tab(s) orally once a day (08 May 2024 09:37)      MEDICATIONS  (STANDING):  aspirin  chewable 81 milliGRAM(s) Oral daily  atorvastatin 80 milliGRAM(s) Oral at bedtime  clopidogrel Tablet 75 milliGRAM(s) Oral daily  heparin   Injectable 5000 Unit(s) SubCutaneous every 8 hours  hydrALAZINE 25 milliGRAM(s) Oral every 12 hours  levothyroxine 50 MICROGram(s) Oral daily  metoprolol succinate ER 50 milliGRAM(s) Oral every 24 hours  Nephro-luis 1 Tablet(s) Oral daily  NIFEdipine XL 60 milliGRAM(s) Oral daily  polyethylene glycol 3350 17 Gram(s) Oral daily  senna 1 Tablet(s) Oral at bedtime  sertraline 25 milliGRAM(s) Oral daily    MEDICATIONS  (PRN):          Vital Signs Last 24 Hrs  T(C): 36.9 (12 May 2024 10:12), Max: 36.9 (12 May 2024 10:12)  T(F): 98.4 (12 May 2024 10:12), Max: 98.4 (12 May 2024 10:12)  HR: 76 (12 May 2024 10:12) (70 - 76)  BP: 131/53 (12 May 2024 10:12) (125/61 - 199/94)  BP(mean): --  RR: 17 (12 May 2024 10:12) (16 - 23)  SpO2: 94% (12 May 2024 10:12) (94% - 96%)    Parameters below as of 11 May 2024 18:22  Patient On (Oxygen Delivery Method): room air        REVIEW OF SYSTEMS:  CONSTITUTIONAL: No fever, weight loss, or fatigue  RESPIRATORY: No cough, wheezing, chills or hemoptysis; No shortness of breath  CARDIOVASCULAR: No chest pain or chest pressure.  No shortness of breath or dyspnea on exertion.  No palpitations, dizziness, light headedness, syncope or near syncope.  No edema, no orthopnea.   NEUROLOGICAL: No headaches, memory loss, loss of strength, numbness, or tremors      PHYSICAL EXAM  Constitutional:  WDWN. No acute distress  HEENT: normocephalic, atraumatic.  PERRLA. EOMI  Neck : No JVD. no carotid bruits  Lungs: Decreased breath sounds at bases.  Heart:  S1 and S2. No S3, S4. II/VI systolic murmur.  Abdomen:  soft, non tender.  Extremities: No clubbing, cyanoisis or edema  Nuerologic:  A+O x 3. No focal deficits  Skin:  no rashes        LABS:                        12.5   5.09  )-----------( 197      ( 11 May 2024 07:16 )             38.7     05-11    144  |  106  |  37<H>  ----------------------------<  96  4.2   |  28  |  2.62<H>    Ca    8.3<L>      11 May 2024 07:16    TPro  6.2  /  Alb  2.8<L>  /  TBili  0.3  /  DBili  x   /  AST  27  /  ALT  35  /  AlkPhos  71  05-11    < from: TTE Echo Complete w/o Contrast w/ Doppler (05.08.24 @ 10:37) >   1. Mildly decreased global left ventricular systolic function.   2. Left ventricular ejection fraction, by visual estimation, is 45 to   50%.   3. Mild global left ventricle hypokinesis with regional variation; the   inferoseptal wall and anteroseptal wall appear severely   hypokinetic/akinetic. False tendon visualized.   4. Moderately increased LV wall thickness.   5. Normal left ventricular internal cavity size.   6. Normal right ventricular size and function.   7. The left atrium is normal in size.   8. The right atrium is normal in size.   9. Mild mitral annular calcification.  10. Mild thickening and calcification of the anterior and posterior   mitral valve leaflets.  11. Mild mitral valve regurgitation.  12. Mild tricuspid regurgitation.  13. Aortic valve leaflet calcification with restricted leaflet opening.   Mild aortic valve stenosis (peak velocity 1.5 m/s, mean gradient 5 mmHg,   calculated LILLY by continuity equation 1.6 cm^2, DI 0.5).  14. Mild pulmonic valve regurgitation.  15. Estimated pulmonary artery systolic pressure is 57.8 mmHg assuming a   right atrial pressure of 15 mmHg, which is consistent with moderate   pulmonary hypertension.  16. Small pericardial effusion.  17. Subcostal window not well visualized.    < end of copied text >  < from: 12 Lead ECG (05.10.24 @ 12:26) >  Diagnosis Line Normal sinus rhythm  Right axis deviation  ST and T wave abnormality, consider inferior ischemia  ST and T wave abnormality, consider anterior ischemia  Prolonged QT  Abnormal ECG  When compared with ECG of 10-MAY-2024 12:26,  No significant change was found    < end of copied text >

## 2024-05-12 NOTE — PROGRESS NOTE ADULT - SUBJECTIVE AND OBJECTIVE BOX
Patient is a 89y old  Female who presents with a chief complaint of SOB, HTN (12 May 2024 07:07)    Patient seen and examined at bedside. No overnight events reported.     ALLERGIES:  No Known Allergies    MEDICATIONS  (STANDING):  aspirin  chewable 81 milliGRAM(s) Oral daily  atorvastatin 80 milliGRAM(s) Oral at bedtime  clopidogrel Tablet 75 milliGRAM(s) Oral daily  heparin   Injectable 5000 Unit(s) SubCutaneous every 8 hours  hydrALAZINE 25 milliGRAM(s) Oral every 12 hours  levothyroxine 50 MICROGram(s) Oral daily  metoprolol succinate ER 50 milliGRAM(s) Oral every 24 hours  Nephro-luis 1 Tablet(s) Oral daily  NIFEdipine XL 60 milliGRAM(s) Oral daily  polyethylene glycol 3350 17 Gram(s) Oral daily  senna 1 Tablet(s) Oral at bedtime  sertraline 25 milliGRAM(s) Oral daily    Vital Signs Last 24 Hrs  T(F): 98.4 (12 May 2024 10:12), Max: 98.4 (12 May 2024 10:12)  HR: 76 (12 May 2024 10:12) (70 - 76)  BP: 131/53 (12 May 2024 10:12) (125/61 - 199/94)  RR: 17 (12 May 2024 10:12) (16 - 23)  SpO2: 94% (12 May 2024 10:12) (94% - 96%)    PHYSICAL EXAM:  General: NAD, A/O x 3  ENT: No gross hearing impairment, Moist mucous membranes, no thrush  Neck: Supple, No JVD  Lungs: Clear to auscultation bilaterally, good air entry, non-labored breathing  Cardio: RRR, S1/S2, No murmur  Abdomen: Soft, Nontender, Nondistended; Bowel sounds present  Extremities: No calf tenderness, No cyanosis, No pitting edema  Psych: Appropriate mood and affect    LABS:                        12.5   5.09  )-----------( 197      ( 11 May 2024 07:16 )             38.7     05-11    144  |  106  |  37  ----------------------------<  96  4.2   |  28  |  2.62    Ca    8.3      11 May 2024 07:16    TPro  6.2  /  Alb  2.8  /  TBili  0.3  /  DBili  x   /  AST  27  /  ALT  35  /  AlkPhos  71  05-11    CARDIAC MARKERS ( 11 May 2024 07:16 )  x     / 108.4 ng/L / x     / x     / x        Urinalysis Basic - ( 11 May 2024 07:16 )    Color: x / Appearance: x / SG: x / pH: x  Gluc: 96 mg/dL / Ketone: x  / Bili: x / Urobili: x   Blood: x / Protein: x / Nitrite: x   Leuk Esterase: x / RBC: x / WBC x   Sq Epi: x / Non Sq Epi: x / Bacteria: x    RADIOLOGY & ADDITIONAL TESTS:    Care Discussed with Consultants/Other Providers:    Patient is a 89y old  Female who presents with a chief complaint of SOB, HTN (12 May 2024 07:07)    Patient seen and examined at bedside. Overnight patient was hypertensive requiring additional medications. She was asymptomatic. Resting comfortably this morning     ALLERGIES:  No Known Allergies    MEDICATIONS  (STANDING):  aspirin  chewable 81 milliGRAM(s) Oral daily  atorvastatin 80 milliGRAM(s) Oral at bedtime  clopidogrel Tablet 75 milliGRAM(s) Oral daily  heparin   Injectable 5000 Unit(s) SubCutaneous every 8 hours  hydrALAZINE 25 milliGRAM(s) Oral every 12 hours  levothyroxine 50 MICROGram(s) Oral daily  metoprolol succinate ER 50 milliGRAM(s) Oral every 24 hours  Nephro-luis 1 Tablet(s) Oral daily  NIFEdipine XL 60 milliGRAM(s) Oral daily  polyethylene glycol 3350 17 Gram(s) Oral daily  senna 1 Tablet(s) Oral at bedtime  sertraline 25 milliGRAM(s) Oral daily    Vital Signs Last 24 Hrs  T(F): 98.4 (12 May 2024 10:12), Max: 98.4 (12 May 2024 10:12)  HR: 76 (12 May 2024 10:12) (70 - 76)  BP: 131/53 (12 May 2024 10:12) (125/61 - 199/94)  RR: 17 (12 May 2024 10:12) (16 - 23)  SpO2: 94% (12 May 2024 10:12) (94% - 96%)    PHYSICAL EXAM:  General: NAD, A/O x 3  ENT: No gross hearing impairment, Moist mucous membranes, no thrush  Neck: Supple, No JVD  Lungs: Clear to auscultation bilaterally, good air entry, non-labored breathing  Cardio: RRR, S1/S2, No murmur  Abdomen: Soft, Nontender, Nondistended; Bowel sounds present  Extremities: No calf tenderness, No cyanosis, No pitting edema  Psych: Appropriate mood and affect    LABS:                        12.5   5.09  )-----------( 197      ( 11 May 2024 07:16 )             38.7     05-11    144  |  106  |  37  ----------------------------<  96  4.2   |  28  |  2.62    Ca    8.3      11 May 2024 07:16    TPro  6.2  /  Alb  2.8  /  TBili  0.3  /  DBili  x   /  AST  27  /  ALT  35  /  AlkPhos  71  05-11    CARDIAC MARKERS ( 11 May 2024 07:16 )  x     / 108.4 ng/L / x     / x     / x        Urinalysis Basic - ( 11 May 2024 07:16 )    Color: x / Appearance: x / SG: x / pH: x  Gluc: 96 mg/dL / Ketone: x  / Bili: x / Urobili: x   Blood: x / Protein: x / Nitrite: x   Leuk Esterase: x / RBC: x / WBC x   Sq Epi: x / Non Sq Epi: x / Bacteria: x    RADIOLOGY & ADDITIONAL TESTS:    Care Discussed with Consultants/Other Providers:    Patient is a 89y old  Female who presents with a chief complaint of SOB, HTN (12 May 2024 07:07)    Patient seen and examined at bedside. Overnight patient was hypertensive requiring additional medications. She was asymptomatic. Resting comfortably this morning     ALLERGIES:  No Known Allergies    MEDICATIONS  (STANDING):  aspirin  chewable 81 milliGRAM(s) Oral daily  atorvastatin 80 milliGRAM(s) Oral at bedtime  clopidogrel Tablet 75 milliGRAM(s) Oral daily  heparin   Injectable 5000 Unit(s) SubCutaneous every 8 hours  hydrALAZINE 25 milliGRAM(s) Oral every 12 hours  levothyroxine 50 MICROGram(s) Oral daily  metoprolol succinate ER 50 milliGRAM(s) Oral every 24 hours  Nephro-luis 1 Tablet(s) Oral daily  NIFEdipine XL 60 milliGRAM(s) Oral daily  polyethylene glycol 3350 17 Gram(s) Oral daily  senna 1 Tablet(s) Oral at bedtime  sertraline 25 milliGRAM(s) Oral daily    Vital Signs Last 24 Hrs  T(F): 98.4 (12 May 2024 10:12), Max: 98.4 (12 May 2024 10:12)  HR: 76 (12 May 2024 10:12) (70 - 76)  BP: 131/53 (12 May 2024 10:12) (125/61 - 199/94)  RR: 17 (12 May 2024 10:12) (16 - 23)  SpO2: 94% (12 May 2024 10:12) (94% - 96%)    PHYSICAL EXAM:  General: NAD, A/O x 3  ENT: No gross hearing impairment, Moist mucous membranes, no thrush  Neck: Supple, No JVD  Lungs: Clear to auscultation bilaterally, good air entry, non-labored breathing  Cardio: RRR, S1/S2, No murmur  Abdomen: Soft, Nontender, Nondistended; Bowel sounds present  Extremities: No calf tenderness, No cyanosis, No pitting edema  Psych: Appropriate mood and affect    LABS:                        12.5   5.09  )-----------( 197      ( 11 May 2024 07:16 )             38.7     05-11    144  |  106  |  37  ----------------------------<  96  4.2   |  28  |  2.62    Ca    8.3      11 May 2024 07:16    TPro  6.2  /  Alb  2.8  /  TBili  0.3  /  DBili  x   /  AST  27  /  ALT  35  /  AlkPhos  71  05-11    CARDIAC MARKERS ( 11 May 2024 07:16 )  x     / 108.4 ng/L / x     / x     / x        Urinalysis Basic - ( 11 May 2024 07:16 )    Color: x / Appearance: x / SG: x / pH: x  Gluc: 96 mg/dL / Ketone: x  / Bili: x / Urobili: x   Blood: x / Protein: x / Nitrite: x   Leuk Esterase: x / RBC: x / WBC x   Sq Epi: x / Non Sq Epi: x / Bacteria: x    RADIOLOGY & ADDITIONAL TESTS:    Care Discussed with Consultants/Other Providers: yes with Dr. Colon

## 2024-05-12 NOTE — PROGRESS NOTE ADULT - NUTRITIONAL ASSESSMENT
This patient has been assessed with a concern for Malnutrition and has been determined to have a diagnosis/diagnoses of Severe protein-calorie malnutrition.    This patient is being managed with:   Diet Renal Restrictions-  For patients receiving Renal Replacement - No Protein Restr No Conc K No Conc Phos Low Sodium  Supplement Feeding Modality:  Oral  Nepro Cans or Servings Per Day:  1       Frequency:  Daily  Entered: May  9 2024  1:27PM

## 2024-05-13 VITALS
OXYGEN SATURATION: 97 % | HEART RATE: 70 BPM | TEMPERATURE: 98 F | SYSTOLIC BLOOD PRESSURE: 145 MMHG | RESPIRATION RATE: 20 BRPM | DIASTOLIC BLOOD PRESSURE: 80 MMHG

## 2024-05-13 LAB
ANION GAP SERPL CALC-SCNC: 11 MMOL/L — SIGNIFICANT CHANGE UP (ref 5–17)
BUN SERPL-MCNC: 57 MG/DL — HIGH (ref 7–23)
CALCIUM SERPL-MCNC: 8.1 MG/DL — LOW (ref 8.4–10.5)
CHLORIDE SERPL-SCNC: 102 MMOL/L — SIGNIFICANT CHANGE UP (ref 96–108)
CO2 SERPL-SCNC: 27 MMOL/L — SIGNIFICANT CHANGE UP (ref 22–31)
CREAT SERPL-MCNC: 3.32 MG/DL — HIGH (ref 0.5–1.3)
EGFR: 12 ML/MIN/1.73M2 — LOW
GLUCOSE SERPL-MCNC: 114 MG/DL — HIGH (ref 70–99)
HCT VFR BLD CALC: 38.4 % — SIGNIFICANT CHANGE UP (ref 34.5–45)
HGB BLD-MCNC: 12.3 G/DL — SIGNIFICANT CHANGE UP (ref 11.5–15.5)
MCHC RBC-ENTMCNC: 29.4 PG — SIGNIFICANT CHANGE UP (ref 27–34)
MCHC RBC-ENTMCNC: 32 GM/DL — SIGNIFICANT CHANGE UP (ref 32–36)
MCV RBC AUTO: 91.6 FL — SIGNIFICANT CHANGE UP (ref 80–100)
NRBC # BLD: 0 /100 WBCS — SIGNIFICANT CHANGE UP (ref 0–0)
PHOSPHATE SERPL-MCNC: 4.7 MG/DL — HIGH (ref 2.5–4.5)
PLATELET # BLD AUTO: 217 K/UL — SIGNIFICANT CHANGE UP (ref 150–400)
POTASSIUM SERPL-MCNC: 4.1 MMOL/L — SIGNIFICANT CHANGE UP (ref 3.5–5.3)
POTASSIUM SERPL-SCNC: 4.1 MMOL/L — SIGNIFICANT CHANGE UP (ref 3.5–5.3)
RBC # BLD: 4.19 M/UL — SIGNIFICANT CHANGE UP (ref 3.8–5.2)
RBC # FLD: 14.9 % — HIGH (ref 10.3–14.5)
SODIUM SERPL-SCNC: 140 MMOL/L — SIGNIFICANT CHANGE UP (ref 135–145)
WBC # BLD: 5.11 K/UL — SIGNIFICANT CHANGE UP (ref 3.8–10.5)
WBC # FLD AUTO: 5.11 K/UL — SIGNIFICANT CHANGE UP (ref 3.8–10.5)

## 2024-05-13 PROCEDURE — 96376 TX/PRO/DX INJ SAME DRUG ADON: CPT

## 2024-05-13 PROCEDURE — 97535 SELF CARE MNGMENT TRAINING: CPT

## 2024-05-13 PROCEDURE — 99232 SBSQ HOSP IP/OBS MODERATE 35: CPT

## 2024-05-13 PROCEDURE — 85027 COMPLETE CBC AUTOMATED: CPT

## 2024-05-13 PROCEDURE — 97166 OT EVAL MOD COMPLEX 45 MIN: CPT

## 2024-05-13 PROCEDURE — 97110 THERAPEUTIC EXERCISES: CPT

## 2024-05-13 PROCEDURE — 83735 ASSAY OF MAGNESIUM: CPT

## 2024-05-13 PROCEDURE — 80053 COMPREHEN METABOLIC PANEL: CPT

## 2024-05-13 PROCEDURE — 93005 ELECTROCARDIOGRAM TRACING: CPT

## 2024-05-13 PROCEDURE — 0225U NFCT DS DNA&RNA 21 SARSCOV2: CPT

## 2024-05-13 PROCEDURE — 97116 GAIT TRAINING THERAPY: CPT

## 2024-05-13 PROCEDURE — 96375 TX/PRO/DX INJ NEW DRUG ADDON: CPT

## 2024-05-13 PROCEDURE — 93306 TTE W/DOPPLER COMPLETE: CPT

## 2024-05-13 PROCEDURE — 96374 THER/PROPH/DIAG INJ IV PUSH: CPT

## 2024-05-13 PROCEDURE — 99285 EMERGENCY DEPT VISIT HI MDM: CPT | Mod: 25

## 2024-05-13 PROCEDURE — 85025 COMPLETE CBC W/AUTO DIFF WBC: CPT

## 2024-05-13 PROCEDURE — 99261: CPT

## 2024-05-13 PROCEDURE — 80048 BASIC METABOLIC PNL TOTAL CA: CPT

## 2024-05-13 PROCEDURE — 99239 HOSP IP/OBS DSCHRG MGMT >30: CPT

## 2024-05-13 PROCEDURE — 83880 ASSAY OF NATRIURETIC PEPTIDE: CPT

## 2024-05-13 PROCEDURE — 84484 ASSAY OF TROPONIN QUANT: CPT

## 2024-05-13 PROCEDURE — 71045 X-RAY EXAM CHEST 1 VIEW: CPT

## 2024-05-13 PROCEDURE — 84100 ASSAY OF PHOSPHORUS: CPT

## 2024-05-13 PROCEDURE — 97161 PT EVAL LOW COMPLEX 20 MIN: CPT

## 2024-05-13 PROCEDURE — 36415 COLL VENOUS BLD VENIPUNCTURE: CPT

## 2024-05-13 PROCEDURE — 70450 CT HEAD/BRAIN W/O DYE: CPT | Mod: MC

## 2024-05-13 RX ORDER — METOPROLOL TARTRATE 50 MG
1 TABLET ORAL
Qty: 0 | Refills: 0 | DISCHARGE
Start: 2024-05-13

## 2024-05-13 RX ORDER — HYDRALAZINE HCL 50 MG
1 TABLET ORAL
Refills: 0 | DISCHARGE

## 2024-05-13 RX ORDER — CARVEDILOL PHOSPHATE 80 MG/1
1 CAPSULE, EXTENDED RELEASE ORAL
Refills: 0 | DISCHARGE

## 2024-05-13 RX ORDER — HYDRALAZINE HCL 50 MG
1 TABLET ORAL
Qty: 0 | Refills: 0 | DISCHARGE
Start: 2024-05-13

## 2024-05-13 RX ORDER — ATORVASTATIN CALCIUM 80 MG/1
1 TABLET, FILM COATED ORAL
Qty: 0 | Refills: 0 | DISCHARGE

## 2024-05-13 RX ORDER — CLOPIDOGREL BISULFATE 75 MG/1
1 TABLET, FILM COATED ORAL
Qty: 0 | Refills: 0 | DISCHARGE
Start: 2024-05-13

## 2024-05-13 RX ORDER — ATORVASTATIN CALCIUM 80 MG/1
1 TABLET, FILM COATED ORAL
Refills: 0 | DISCHARGE

## 2024-05-13 RX ADMIN — Medication 60 MILLIGRAM(S): at 05:08

## 2024-05-13 RX ADMIN — HEPARIN SODIUM 5000 UNIT(S): 5000 INJECTION INTRAVENOUS; SUBCUTANEOUS at 14:48

## 2024-05-13 RX ADMIN — Medication 50 MILLIGRAM(S): at 05:08

## 2024-05-13 RX ADMIN — Medication 50 MILLIGRAM(S): at 14:50

## 2024-05-13 RX ADMIN — SERTRALINE 25 MILLIGRAM(S): 25 TABLET, FILM COATED ORAL at 14:49

## 2024-05-13 RX ADMIN — POLYETHYLENE GLYCOL 3350 17 GRAM(S): 17 POWDER, FOR SOLUTION ORAL at 14:49

## 2024-05-13 RX ADMIN — Medication 81 MILLIGRAM(S): at 14:48

## 2024-05-13 RX ADMIN — Medication 50 MICROGRAM(S): at 05:08

## 2024-05-13 RX ADMIN — Medication 1 TABLET(S): at 15:28

## 2024-05-13 RX ADMIN — CLOPIDOGREL BISULFATE 75 MILLIGRAM(S): 75 TABLET, FILM COATED ORAL at 14:48

## 2024-05-13 RX ADMIN — HEPARIN SODIUM 5000 UNIT(S): 5000 INJECTION INTRAVENOUS; SUBCUTANEOUS at 05:09

## 2024-05-13 NOTE — PROGRESS NOTE ADULT - SUBJECTIVE AND OBJECTIVE BOX
ALICE PEREA  [id number]    Chief Complaint: htn, sob  Interval events: pt seen and examined, labs and chart reviewed. denies cardiopulmonary complaints.     ALLERGIES:  No Known Allergies      CURRENT MEDICATIONS:  MEDICATIONS  (STANDING):  aspirin  chewable 81 milliGRAM(s) Oral daily  atorvastatin 20 milliGRAM(s) Oral at bedtime  carvedilol 25 milliGRAM(s) Oral every 12 hours  heparin   Injectable 5000 Unit(s) SubCutaneous every 8 hours  hydrALAZINE 50 milliGRAM(s) Oral every 8 hours  levothyroxine 50 MICROGram(s) Oral daily  Nephro-luis 1 Tablet(s) Oral daily  NIFEdipine XL 60 milliGRAM(s) Oral daily  polyethylene glycol 3350 17 Gram(s) Oral daily  senna 1 Tablet(s) Oral at bedtime  sertraline 25 milliGRAM(s) Oral daily      ROS:  All 10 systems reviewed and positives noted in HPI    OBJECTIVE:    VITAL SIGNS:  Vital Signs Last 24 Hrs  T(C): 36.7 (08 May 2024 07:31), Max: 36.7 (08 May 2024 07:31)  T(F): 98.1 (08 May 2024 07:31), Max: 98.1 (08 May 2024 07:31)  HR: 70 (08 May 2024 09:00) (70 - 81)  BP: 156/67 (08 May 2024 09:00) (156/67 - 232/125)  BP(mean): 93 (08 May 2024 09:00) (88 - 135)  RR: 20 (08 May 2024 09:00) (20 - 30)  SpO2: 96% (08 May 2024 09:00) (93% - 100%)    Parameters below as of 08 May 2024 09:00  Patient On (Oxygen Delivery Method): room air      PHYSICAL EXAM:  General:  no distress  HEENT: sclera anicteric  Neck: supple  CVS: JVP ~ 7 cm H20, RRR, s1, s2, no murmurs/rubs/gallops  Chest: unlabored respirations, clear to auscultation anteriorly  Abdomen: non-distended  Extremities: no lower extremity edema b/l  Neuro: awake, alert & oriented       LABS:                        13.6   18.37 )-----------( 240      ( 08 May 2024 07:30 )             42.3     05-08    140  |  101  |  74<H>  ----------------------------<  172<H>  4.2   |  27  |  3.46<H>    Ca    8.6      08 May 2024 07:30    TPro  7.1  /  Alb  3.1<L>  /  TBili  0.4  /  DBili  x   /  AST  65<H>  /  ALT  77<H>  /  AlkPhos  89  05-08        ECG: Sinus rhythm, PAC      TTE: < from: TTE Echo Complete w/o Contrast w/ Doppler (05.08.24 @ 10:37) >   1. Mildly decreased global left ventricular systolic function.   2. Left ventricular ejection fraction, by visual estimation, is 45 to 50%.   3. Mild global left ventricle hypokinesis with regional variation; the   inferoseptal wall and anteroseptal wall appear severely   hypokinetic/akinetic. False tendon visualized.   4. Moderately increased LV wall thickness.   5. Normal left ventricular internal cavity size.   6. Normal right ventricular size and function.   7. The left atrium is normal in size.   8. The right atrium is normal in size.   9. Mild mitral annular calcification.  10. Mild thickening and calcification of the anterior and posterior   mitral valve leaflets.  11. Mild mitral valve regurgitation.  12. Mild tricuspid regurgitation.  13. Aortic valve leaflet calcification with restricted leaflet opening.   Mild aortic valve stenosis (peak velocity 1.5 m/s, mean gradient 5 mmHg,   calculated LILLY by continuity equation 1.6 cm^2, DI 0.5).  14. Mild pulmonic valve regurgitation.  15. Estimated pulmonary artery systolic pressure is 57.8 mmHg assuming a   right atrial pressure of 15 mmHg, which is consistent with moderate   pulmonary hypertension.  16. Small pericardial effusion.  17. Subcostal window not well visualized.       ALICE PEREA  [id number]    Chief Complaint: Hypertension, HFmrEF    Interval events: Patient seen and examined, labs and chart reviewed. denies cardiopulmonary complaints.     ALLERGIES:  No Known Allergies      CURRENT MEDICATIONS:  MEDICATIONS  (STANDING):  aspirin  chewable 81 milliGRAM(s) Oral daily  atorvastatin 20 milliGRAM(s) Oral at bedtime  carvedilol 25 milliGRAM(s) Oral every 12 hours  heparin   Injectable 5000 Unit(s) SubCutaneous every 8 hours  hydrALAZINE 50 milliGRAM(s) Oral every 8 hours  levothyroxine 50 MICROGram(s) Oral daily  Nephro-luis 1 Tablet(s) Oral daily  NIFEdipine XL 60 milliGRAM(s) Oral daily  polyethylene glycol 3350 17 Gram(s) Oral daily  senna 1 Tablet(s) Oral at bedtime  sertraline 25 milliGRAM(s) Oral daily      ROS:  All 10 systems reviewed and positives noted in HPI    OBJECTIVE:    VITAL SIGNS:  Vital Signs Last 24 Hrs  T(C): 36.7 (08 May 2024 07:31), Max: 36.7 (08 May 2024 07:31)  T(F): 98.1 (08 May 2024 07:31), Max: 98.1 (08 May 2024 07:31)  HR: 70 (08 May 2024 09:00) (70 - 81)  BP: 156/67 (08 May 2024 09:00) (156/67 - 232/125)  BP(mean): 93 (08 May 2024 09:00) (88 - 135)  RR: 20 (08 May 2024 09:00) (20 - 30)  SpO2: 96% (08 May 2024 09:00) (93% - 100%)    Parameters below as of 08 May 2024 09:00  Patient On (Oxygen Delivery Method): room air      PHYSICAL EXAM:  General:  no distress  HEENT: sclera anicteric  Neck: supple  CVS: JVP ~ 7 cm H20, RRR, s1, s2, no murmurs/rubs/gallops  Chest: unlabored respirations, clear to auscultation anteriorly  Abdomen: non-distended  Extremities: no lower extremity edema b/l  Neuro: awake, alert & oriented       LABS:                        13.6   18.37 )-----------( 240      ( 08 May 2024 07:30 )             42.3     05-08    140  |  101  |  74<H>  ----------------------------<  172<H>  4.2   |  27  |  3.46<H>    Ca    8.6      08 May 2024 07:30    TPro  7.1  /  Alb  3.1<L>  /  TBili  0.4  /  DBili  x   /  AST  65<H>  /  ALT  77<H>  /  AlkPhos  89  05-08        ECG: Sinus rhythm, PAC      TTE: < from: TTE Echo Complete w/o Contrast w/ Doppler (05.08.24 @ 10:37) >   1. Mildly decreased global left ventricular systolic function.   2. Left ventricular ejection fraction, by visual estimation, is 45 to 50%.   3. Mild global left ventricle hypokinesis with regional variation; the   inferoseptal wall and anteroseptal wall appear severely   hypokinetic/akinetic. False tendon visualized.   4. Moderately increased LV wall thickness.   5. Normal left ventricular internal cavity size.   6. Normal right ventricular size and function.   7. The left atrium is normal in size.   8. The right atrium is normal in size.   9. Mild mitral annular calcification.  10. Mild thickening and calcification of the anterior and posterior   mitral valve leaflets.  11. Mild mitral valve regurgitation.  12. Mild tricuspid regurgitation.  13. Aortic valve leaflet calcification with restricted leaflet opening.   Mild aortic valve stenosis (peak velocity 1.5 m/s, mean gradient 5 mmHg,   calculated LILLY by continuity equation 1.6 cm^2, DI 0.5).  14. Mild pulmonic valve regurgitation.  15. Estimated pulmonary artery systolic pressure is 57.8 mmHg assuming a   right atrial pressure of 15 mmHg, which is consistent with moderate   pulmonary hypertension.  16. Small pericardial effusion.  17. Subcostal window not well visualized.

## 2024-05-13 NOTE — PROGRESS NOTE ADULT - ASSESSMENT
Assessment:  89-year-old female with history of ESRD on HD Tuesday, Thursday, Saturday, chronic diastolic CHF, HTN, anemia, HLD hypothyroidism and VTE admitted for shortness of breath and hypertension.  Patient states this morning after getting back from the bathroom she felt short of breath.  Was hypertensive upon arrival.  Denies any chest pain, palpitations.  Patient reports improvement now and states that she has been compliant with her dialysis    Recommendations  EKG sinus rhythm no acute ischemia  trop peaked, continue medical management for NSTEMI  Continue current CV meds, patient may not be a candidate for ischemic evaluation given advanced age of 89 years and overall risk for complications  contine antihtn regimen, pt with wide range of BP, recommended close followup with pcp, cardiologist, and nephrologist post discharge. pt not a candidate for further antihtn 2/2 ckd and risk of hypotension with dialysis   Assessment:  89-year-old female with history of ESRD on HD Tuesday, Thursday, Saturday, chronic diastolic CHF, HTN, anemia, HLD, hypothyroidism and VTE admitted for shortness of breath and hypertension.  Patient states this morning after getting back from the bathroom she felt short of breath.  Was hypertensive upon arrival.  Denies any chest pain, palpitations.  Patient reports improvement now and states that she has been compliant with her dialysis    Recommendations  EKG sinus rhythm no acute ischemia  troponin peaked, continue medical management for NSTEMI  Continue current CV meds, patient may not be a candidate for ischemic evaluation given advanced age of 89 years and overall risk for complications  contine antihtn regimen, pt with wide range of BP, recommended close followup with pcp, cardiologist, and nephrologist post discharge. pt not a candidate for further antihtn 2/2 ckd and risk of hypotension with dialysis

## 2024-05-13 NOTE — PROGRESS NOTE ADULT - SUBJECTIVE AND OBJECTIVE BOX
Patient is a 89y old  Female who presents with a chief complaint of SOB, HTN (13 May 2024 07:22)    Patient seen and examined at bedside. No overnight events reported.     ALLERGIES:  No Known Allergies    MEDICATIONS  (STANDING):  aspirin  chewable 81 milliGRAM(s) Oral daily  atorvastatin 80 milliGRAM(s) Oral at bedtime  clopidogrel Tablet 75 milliGRAM(s) Oral daily  heparin   Injectable 5000 Unit(s) SubCutaneous every 8 hours  hydrALAZINE 50 milliGRAM(s) Oral every 8 hours  levothyroxine 50 MICROGram(s) Oral daily  metoprolol succinate ER 50 milliGRAM(s) Oral every 24 hours  Nephro-luis 1 Tablet(s) Oral daily  NIFEdipine XL 60 milliGRAM(s) Oral daily  polyethylene glycol 3350 17 Gram(s) Oral daily  senna 1 Tablet(s) Oral at bedtime  sertraline 25 milliGRAM(s) Oral daily    MEDICATIONS  (PRN):    Vital Signs Last 24 Hrs  T(F): 97.5 (13 May 2024 05:00), Max: 98.4 (12 May 2024 22:19)  HR: 74 (13 May 2024 09:42) (71 - 80)  BP: 154/76 (13 May 2024 09:42) (110/84 - 186/78)  RR: 19 (13 May 2024 09:42) (15 - 24)  SpO2: 97% (13 May 2024 09:42) (93% - 99%)  I&O's Summary    PHYSICAL EXAM:  General: NAD, A/O x 3  ENT: No gross hearing impairment, Moist mucous membranes, no thrush  Neck: Supple, No JVD  Lungs: Clear to auscultation bilaterally, good air entry, non-labored breathing  Cardio: RRR, S1/S2, No murmur  Abdomen: Soft, Nontender, Nondistended; Bowel sounds present  Extremities: No calf tenderness, No cyanosis, No pitting edema  Psych: Appropriate mood and affect    LABS:                        12.5   5.09  )-----------( 197      ( 11 May 2024 07:16 )             38.7     05-12    141  |  103  |  63  ----------------------------<  166  4.4   |  26  |  3.99    Ca    8.2      12 May 2024 11:48  Phos  5.6     05-12  Mg     1.9     05-12    TPro  6.2  /  Alb  2.8  /  TBili  0.3  /  DBili  x   /  AST  27  /  ALT  35  /  AlkPhos  71  05-11    CARDIAC MARKERS ( 11 May 2024 07:16 )  x     / 108.4 ng/L / x     / x     / x        Urinalysis Basic - ( 12 May 2024 11:48 )    Color: x / Appearance: x / SG: x / pH: x  Gluc: 166 mg/dL / Ketone: x  / Bili: x / Urobili: x   Blood: x / Protein: x / Nitrite: x   Leuk Esterase: x / RBC: x / WBC x   Sq Epi: x / Non Sq Epi: x / Bacteria: x    RADIOLOGY & ADDITIONAL TESTS:    Care Discussed with Consultants/Other Providers:    Patient is a 89y old  Female who presents with a chief complaint of SOB, HTN (13 May 2024 07:22)    Patient seen and examined at bedside. No overnight events reported.     ALLERGIES:  No Known Allergies    MEDICATIONS  (STANDING):  aspirin  chewable 81 milliGRAM(s) Oral daily  atorvastatin 80 milliGRAM(s) Oral at bedtime  clopidogrel Tablet 75 milliGRAM(s) Oral daily  heparin   Injectable 5000 Unit(s) SubCutaneous every 8 hours  hydrALAZINE 50 milliGRAM(s) Oral every 8 hours  levothyroxine 50 MICROGram(s) Oral daily  metoprolol succinate ER 50 milliGRAM(s) Oral every 24 hours  Nephro-luis 1 Tablet(s) Oral daily  NIFEdipine XL 60 milliGRAM(s) Oral daily  polyethylene glycol 3350 17 Gram(s) Oral daily  senna 1 Tablet(s) Oral at bedtime  sertraline 25 milliGRAM(s) Oral daily    MEDICATIONS  (PRN):    Vital Signs Last 24 Hrs  T(F): 97.5 (13 May 2024 05:00), Max: 98.4 (12 May 2024 22:19)  HR: 74 (13 May 2024 09:42) (71 - 80)  BP: 154/76 (13 May 2024 09:42) (110/84 - 186/78)  RR: 19 (13 May 2024 09:42) (15 - 24)  SpO2: 97% (13 May 2024 09:42) (93% - 99%)  I&O's Summary    PHYSICAL EXAM:  General: NAD, A/O x 3  ENT: No gross hearing impairment, Moist mucous membranes, no thrush  Neck: Supple, No JVD  Lungs: Clear to auscultation bilaterally, good air entry, non-labored breathing  Cardio: RRR, S1/S2, No murmur  Abdomen: Soft, Nontender, Nondistended; Bowel sounds present  Extremities: No calf tenderness, No cyanosis, No pitting edema  Psych: Appropriate mood and affect    LABS:                        12.5   5.09  )-----------( 197      ( 11 May 2024 07:16 )             38.7     05-12    141  |  103  |  63  ----------------------------<  166  4.4   |  26  |  3.99    Ca    8.2      12 May 2024 11:48  Phos  5.6     05-12  Mg     1.9     05-12    TPro  6.2  /  Alb  2.8  /  TBili  0.3  /  DBili  x   /  AST  27  /  ALT  35  /  AlkPhos  71  05-11    CARDIAC MARKERS ( 11 May 2024 07:16 )  x     / 108.4 ng/L / x     / x     / x        Urinalysis Basic - ( 12 May 2024 11:48 )    Color: x / Appearance: x / SG: x / pH: x  Gluc: 166 mg/dL / Ketone: x  / Bili: x / Urobili: x   Blood: x / Protein: x / Nitrite: x   Leuk Esterase: x / RBC: x / WBC x   Sq Epi: x / Non Sq Epi: x / Bacteria: x    RADIOLOGY & ADDITIONAL TESTS:    Care Discussed with Consultants/Other Providers: yes with cardiology Dr. Leung

## 2024-05-13 NOTE — PROGRESS NOTE ADULT - NS ATTEND AMEND GEN_ALL_CORE FT
Reviewed AM labs, stable compared to prior. Spoke with cardio, pt cleared for d/c back to [facility] on current regimen. Spoke with nephro - pt getting HD and then can get d/c to [facility]. Pt did not receive Lasix as she was not fluid overloaded. Pt not in CHF exacerbation. Pt SOB 2/2 NSTEMI/hypertensive emergency
I have seen and examined the patient. I have discussed case with MONTSE Saba.    Neena Morton is a 89 year old woman with past medical history of End stage renal disease (on hemodialysis), Hypertension and Hyperlipidemia who was brought in by EMS from living facility due to altered mental status and dyspnea, found to have hypertensive emergency and NSTEMI.    ECG consistent with sinus rhythm and nonspecific ST abnormalities. Troponins elevated and have now peaked, may be secondary to hypertensive emergency (SBP 230s) and end stage renal disease, however cannot rule out underlying acute coronary syndrome given echo findings. Echo consistent with LVEF 45-50%, inferoseptal and anteroseptal wall akinesis, moderate pulmonary hypertension.    The patient denies angina, reports breathing has improved. Overall, the patient has been medically managed for NSTEMI due to advanced age, end stage renal disease and risks of complications with coronary angiogram, continue dual antiplatelets and statin. Discussed with patient's son who agrees with plan. In regards to HFmrEF, there is no clear indication for ARNI in this situation and SGLT2i and/or MRA may not be able to be tolerated due to end stage renal disease. No indication for ICD for this EF range. Continue antihypertensive regimen; Coreg, Nifedipine & Hydralazine. Further addition of antihypertensives limited due to fact patient may be come hypotensive with dialysis. Dialysis per [facility] for fluid removal.    The patient should follow up with her cardiologist, Dr. Wetzel at [facility]. The patient is CV stable for discharge today, please re-consult if needed.    Kamaljit Leung MD  Cardiology
pt received multiple doses Hydralazine and Labetalol overnight for hypertension. Spoke with cardio this AM and increased Hydralazine to 50 q8h. WIll monitor with plan for d/c tomorrow after HD if stable. Discussed with Dr. Merchant as well.
Reviewed AM labs, stable and trop downtrending. Pt with Vtach overnight; spoke with Dr. Colon who rec starting Toprol and monitoring. Stopped Coreg. Will monitor on tele with possible d/c tomorrow planned to [facility]
Admission

## 2024-05-13 NOTE — PROGRESS NOTE ADULT - PROVIDER SPECIALTY LIST ADULT
Cardiology
Nephrology
Nephrology
Hospitalist
Nephrology
Nephrology
Cardiology
Hospitalist
Hospitalist
Nephrology

## 2024-05-13 NOTE — PROGRESS NOTE ADULT - SUBJECTIVE AND OBJECTIVE BOX
Seen on HD    Vital Signs Last 24 Hrs  T(C): 36.6 (05-13-24 @ 16:00), Max: 36.9 (05-12-24 @ 22:19)  T(F): 97.8 (05-13-24 @ 16:00), Max: 98.4 (05-12-24 @ 22:19)  HR: 70 (05-13-24 @ 16:00) (70 - 75)  BP: 145/80 (05-13-24 @ 16:00) (136/54 - 186/78)  RR: 20 (05-13-24 @ 16:00) (18 - 24)  SpO2: 97% (05-13-24 @ 16:00) (93% - 98%)    I&O's Detail    13 May 2024 07:01  -  13 May 2024 22:16  --------------------------------------------------------  OUT:    Other (mL): 1500 mL  Total OUT: 1500 mL    s1s2  b/l air entry  soft, ND  no edema                                                      12.3   5.11  )-----------( 217      ( 13 May 2024 10:00 )             38.4     13 May 2024 10:00    140    |  102    |  57     ----------------------------<  114    4.1     |  27     |  3.32     Ca    8.1        13 May 2024 10:00  Phos  4.7       13 May 2024 10:00  Mg     1.9       12 May 2024 11:48    aspirin  chewable 81 milliGRAM(s) Oral daily  atorvastatin 80 milliGRAM(s) Oral at bedtime  clopidogrel Tablet 75 milliGRAM(s) Oral daily  heparin   Injectable 5000 Unit(s) SubCutaneous every 8 hours  hydrALAZINE 50 milliGRAM(s) Oral every 8 hours  levothyroxine 50 MICROGram(s) Oral daily  metoprolol succinate ER 50 milliGRAM(s) Oral every 24 hours  Nephro-luis 1 Tablet(s) Oral daily  NIFEdipine XL 60 milliGRAM(s) Oral daily  polyethylene glycol 3350 17 Gram(s) Oral daily  senna 1 Tablet(s) Oral at bedtime  sertraline 25 milliGRAM(s) Oral daily    A/P:    ESRD on HD MWF  Adm w/CHF iso NSTEMI  Cards f/u appr  Conservative approach   S/p stable HD today  TMP 1.5L  Pt will continue HD as per her usual MWF schedule at [facility]     [phone]

## 2024-05-13 NOTE — PROGRESS NOTE ADULT - ASSESSMENT
89 y/o F with PMH ESRD on HD Tu/Th/Sat, chronic diastolic CHF, HTN, anemia of chronic disease, HLD, hypothyroidism, depression, anxiety, hx of VTE, severe protein calorie malnutrition,  BIBEMS from [facility] for AMS, SOB, and HTN admitted for AMS, hypertensive emergency, and SOB r/o acute CHF exacerbation. On initial eval pt noted with tachypnea, diaphoresis, awake but not responsive, /125, HR 81, RR 25, O2 93% on RA, s/p lasix 40mg IVP x1, hydralazine 10mg IVP x1 with SBP improved to 168    #NSTEMI   #Hypertensive emergency   #Acute on chronic diastolic CHF exacerbation   #NSVT morning 5/11  - TTE reviewed  - Appreciate cardiology recs - medical management  - Due to arrhythmia, changed beta-blockers. Stopped carvedilol and started metoprolol succinate 50 mg daily on 5/12  - Continue nifedipine   - Continue hydralazine - increase to 50 mg q8h  - Continue dual antiplatelet therapy  - Continue high intensity statin    #ESRD on HD   #Anemia of chronic disease  - Last HD was Friday 5/10  - Nephro following  - HD per nephro  - Monitor CBC, BMP    #Transaminitis - improved   - Monitor, avoid hepatotoxins    #Hypothyroidism   - Continue synthroid    #HLD  - Continue Lipitor - will increase dose as per cardiology    #DVT ppx  - heparin    Code Status - DNR/DNI    Dispo: Anticipate DC to [facility] once medically cleared    5/12: Updated HCP - son Corby Smith [phone]        89 y/o F with PMH ESRD on HD Tu/Th/Sat, chronic diastolic CHF, HTN, anemia of chronic disease, HLD, hypothyroidism, depression, anxiety, hx of VTE, severe protein calorie malnutrition,  BIBEMS from [facility] for AMS, SOB, and HTN admitted for AMS, hypertensive emergency, and SOB r/o acute CHF exacerbation. On initial eval pt noted with tachypnea, diaphoresis, awake but not responsive, /125, HR 81, RR 25, O2 93% on RA, s/p lasix 40mg IVP x1, hydralazine 10mg IVP x1 with SBP improved to 168    #NSTEMI   #Hypertensive emergency   #Acute on chronic diastolic CHF exacerbation   #NSVT morning 5/11  - TTE reviewed  - Appreciate cardiology recs - medical management  - Due to arrhythmia, changed beta-blockers. Stopped carvedilol and started metoprolol succinate 50 mg daily on 5/12  - Continue nifedipine   - Continue hydralazine - increase to 50 mg q8h  - Continue dual antiplatelet therapy  - Continue high intensity statin    #ESRD on HD   #Anemia of chronic disease  - Last HD was Friday 5/10  - Nephro following  - HD per nephro  - Monitor CBC, BMP    #Transaminitis - improved   - Monitor, avoid hepatotoxins    #Hypothyroidism   - Continue synthroid    #HLD  - Continue Lipitor - will increase dose as per cardiology    #DVT ppx  - heparin    Code Status - DNR/DNI    Dispo: Anticipate DC to [facility] once medically    5/13: Updated HCP - son Corby Smith [phone]        89 y/o F with PMH ESRD on HD Tu/Th/Sat, chronic diastolic CHF, HTN, anemia of chronic disease, HLD, hypothyroidism, depression, anxiety, hx of VTE, severe protein calorie malnutrition,  BIBEMS from [facility] for AMS, SOB, and HTN admitted for AMS, hypertensive emergency, and SOB r/o acute CHF exacerbation. On initial eval pt noted with tachypnea, diaphoresis, awake but not responsive, /125, HR 81, RR 25, O2 93% on RA, s/p lasix 40mg IVP x1, hydralazine 10mg IVP x1 with SBP improved to 168    #NSTEMI   #Hypertensive emergency   #NSVT morning 5/11  - TTE reviewed  - Appreciate cardiology recs - medical management  - Due to arrhythmia, changed beta-blockers. Stopped carvedilol and started metoprolol succinate 50 mg daily on 5/12  - Continue nifedipine   - Continue hydralazine - increase to 50 mg q8h  - Continue dual antiplatelet therapy  - Continue high intensity statin    #ESRD on HD   #Anemia of chronic disease  - Last HD was Friday 5/10  - Nephro following  - HD per nephro  - Monitor CBC, BMP    #Transaminitis - improved   - Monitor, avoid hepatotoxins    #Hypothyroidism   - Continue synthroid    #HLD  - Continue Lipitor - will increase dose as per cardiology    #DVT ppx  - heparin    Code Status - DNR/DNI    Dispo: Anticipate DC to [facility] once medically    5/13: Updated HCP - son Corby Smith [phone]        89 y/o F with PMH ESRD on HD Tu/Th/Sat, chronic diastolic CHF, HTN, anemia of chronic disease, HLD, hypothyroidism, depression, anxiety, hx of VTE, severe protein calorie malnutrition,  BIBEMS from [facility] for AMS, SOB, and HTN admitted for AMS, hypertensive emergency, and SOB r/o acute CHF exacerbation. On initial eval pt noted with tachypnea, diaphoresis, awake but not responsive, /125, HR 81, RR 25, O2 93% on RA, s/p lasix 40mg IVP x1, hydralazine 10mg IVP x1 with SBP improved to 168    #NSTEMI   #Hypertensive emergency   #NSVT morning 5/11  - pt with Hx chronic diastolic CHF, NOT in exacerbation; pt SOB 2/2 to above, not fluid overload  - TTE reviewed  - Appreciate cardiology recs - medical management  - Due to arrhythmia, changed beta-blockers. Stopped carvedilol and started metoprolol succinate 50 mg daily on 5/12  - Continue nifedipine   - Continue hydralazine - increase to 50 mg q8h  - Continue dual antiplatelet therapy  - Continue high intensity statin    #ESRD on HD   #Anemia of chronic disease  - Last HD was Friday 5/10  - Nephro following  - HD per nephro  - Monitor CBC, BMP    #Transaminitis - improved   - Monitor, avoid hepatotoxins    #Hypothyroidism   - Continue synthroid    #HLD  - Continue Lipitor - will increase dose as per cardiology    #DVT ppx  - heparin    Code Status - DNR/DNI    Dispo: Anticipate DC to [facility] once medically    5/13: Updated HCP - son Corby Smith [phone]

## 2024-05-13 NOTE — DISCHARGE NOTE NURSING/CASE MANAGEMENT/SOCIAL WORK - PATIENT PORTAL LINK FT
You can access the FollowMyHealth Patient Portal offered by [facility] by registering at the following website: [email or web address]. By joining [facility]’s FollowMyHealth portal, you will also be able to view your health information using other applications (apps) compatible with our system.

## 2024-05-13 NOTE — PROGRESS NOTE ADULT - REASON FOR ADMISSION
SOB, HTN

## 2024-05-13 NOTE — DISCHARGE NOTE NURSING/CASE MANAGEMENT/SOCIAL WORK - NSDCPEFALRISK_GEN_ALL_CORE
For information on Fall & Injury Prevention, visit: [email or web address] OR  [email or web address] OR  https://www.cdc.gov/steadi/patient.html

## 2025-07-01 NOTE — DIETITIAN INITIAL EVALUATION ADULT - NSICDXPASTMEDICALHX_GEN_ALL_CORE_FT
PAST MEDICAL HISTORY:  Anxiety and depression     ESRD on dialysis     HLD (hyperlipidemia)     HTN (hypertension)     Hypothyroidism     
[[id number]]

## 2025-07-10 NOTE — ED ADULT NURSE NOTE - NSFALLCONCLUSION_ED_ALL_ED
Patient went to bathroom with walker and assisted by RN (me) patient had episode of explosive diarrhea, tried to stand and clean himself slipped and hit left side of head on bathroom wall. Called rapid immediately. CT was ordered of the head, pending results.   Fall with Harm Risk

## 2025-07-26 ENCOUNTER — EMERGENCY (EMERGENCY)
Facility: HOSPITAL | Age: 89
LOS: 1 days | End: 2025-07-26
Attending: EMERGENCY MEDICINE | Admitting: EMERGENCY MEDICINE
Payer: MEDICARE

## 2025-07-26 VITALS
HEIGHT: 66 IN | HEART RATE: 87 BPM | RESPIRATION RATE: 20 BRPM | OXYGEN SATURATION: 99 % | TEMPERATURE: 98 F | WEIGHT: 119.93 LBS | DIASTOLIC BLOOD PRESSURE: 73 MMHG | SYSTOLIC BLOOD PRESSURE: 197 MMHG

## 2025-07-26 VITALS
RESPIRATION RATE: 20 BRPM | SYSTOLIC BLOOD PRESSURE: 162 MMHG | OXYGEN SATURATION: 98 % | HEART RATE: 72 BPM | DIASTOLIC BLOOD PRESSURE: 69 MMHG

## 2025-07-26 PROBLEM — N18.6 END STAGE RENAL DISEASE: Chronic | Status: ACTIVE | Noted: 2024-05-08

## 2025-07-26 PROBLEM — E78.5 HYPERLIPIDEMIA, UNSPECIFIED: Chronic | Status: ACTIVE | Noted: 2024-05-08

## 2025-07-26 PROBLEM — I10 ESSENTIAL (PRIMARY) HYPERTENSION: Chronic | Status: ACTIVE | Noted: 2024-05-08

## 2025-07-26 PROBLEM — E03.9 HYPOTHYROIDISM, UNSPECIFIED: Chronic | Status: ACTIVE | Noted: 2024-05-08

## 2025-07-26 PROBLEM — F41.9 ANXIETY DISORDER, UNSPECIFIED: Chronic | Status: ACTIVE | Noted: 2024-05-08

## 2025-07-26 LAB
ALBUMIN SERPL ELPH-MCNC: 3.2 G/DL — LOW (ref 3.3–5)
ALP SERPL-CCNC: 95 U/L — SIGNIFICANT CHANGE UP (ref 40–120)
ALT FLD-CCNC: 24 U/L — SIGNIFICANT CHANGE UP (ref 10–45)
ANION GAP SERPL CALC-SCNC: 11 MMOL/L — SIGNIFICANT CHANGE UP (ref 5–17)
AST SERPL-CCNC: 16 U/L — SIGNIFICANT CHANGE UP (ref 10–40)
BASOPHILS # BLD AUTO: 0.05 K/UL — SIGNIFICANT CHANGE UP (ref 0–0.2)
BASOPHILS NFR BLD AUTO: 0.7 % — SIGNIFICANT CHANGE UP (ref 0–2)
BILIRUB SERPL-MCNC: 0.6 MG/DL — SIGNIFICANT CHANGE UP (ref 0.2–1.2)
BUN SERPL-MCNC: 35 MG/DL — HIGH (ref 7–23)
CALCIUM SERPL-MCNC: 9 MG/DL — SIGNIFICANT CHANGE UP (ref 8.4–10.5)
CHLORIDE SERPL-SCNC: 100 MMOL/L — SIGNIFICANT CHANGE UP (ref 96–108)
CO2 SERPL-SCNC: 29 MMOL/L — SIGNIFICANT CHANGE UP (ref 22–31)
CREAT SERPL-MCNC: 2.72 MG/DL — HIGH (ref 0.5–1.3)
EGFR: 15 ML/MIN/1.73M2 — LOW
EGFR: 15 ML/MIN/1.73M2 — LOW
EOSINOPHIL # BLD AUTO: 0.6 K/UL — HIGH (ref 0–0.5)
EOSINOPHIL NFR BLD AUTO: 8 % — HIGH (ref 0–6)
GLUCOSE SERPL-MCNC: 100 MG/DL — HIGH (ref 70–99)
HCT VFR BLD CALC: 29 % — LOW (ref 34.5–45)
HGB BLD-MCNC: 9.7 G/DL — LOW (ref 11.5–15.5)
IMM GRANULOCYTES NFR BLD AUTO: 0.1 % — SIGNIFICANT CHANGE UP (ref 0–0.9)
LYMPHOCYTES # BLD AUTO: 1.05 K/UL — SIGNIFICANT CHANGE UP (ref 1–3.3)
LYMPHOCYTES # BLD AUTO: 14 % — SIGNIFICANT CHANGE UP (ref 13–44)
MCHC RBC-ENTMCNC: 31.9 PG — SIGNIFICANT CHANGE UP (ref 27–34)
MCHC RBC-ENTMCNC: 33.4 G/DL — SIGNIFICANT CHANGE UP (ref 32–36)
MCV RBC AUTO: 95.4 FL — SIGNIFICANT CHANGE UP (ref 80–100)
MONOCYTES # BLD AUTO: 0.73 K/UL — SIGNIFICANT CHANGE UP (ref 0–0.9)
MONOCYTES NFR BLD AUTO: 9.7 % — SIGNIFICANT CHANGE UP (ref 2–14)
NEUTROPHILS # BLD AUTO: 5.08 K/UL — SIGNIFICANT CHANGE UP (ref 1.8–7.4)
NEUTROPHILS NFR BLD AUTO: 67.5 % — SIGNIFICANT CHANGE UP (ref 43–77)
NRBC BLD AUTO-RTO: 0 /100 WBCS — SIGNIFICANT CHANGE UP (ref 0–0)
PLATELET # BLD AUTO: 217 K/UL — SIGNIFICANT CHANGE UP (ref 150–400)
POTASSIUM SERPL-MCNC: 3.6 MMOL/L — SIGNIFICANT CHANGE UP (ref 3.5–5.3)
POTASSIUM SERPL-SCNC: 3.6 MMOL/L — SIGNIFICANT CHANGE UP (ref 3.5–5.3)
PROT SERPL-MCNC: 7.1 G/DL — SIGNIFICANT CHANGE UP (ref 6–8.3)
RBC # BLD: 3.04 M/UL — LOW (ref 3.8–5.2)
RBC # FLD: 12.6 % — SIGNIFICANT CHANGE UP (ref 10.3–14.5)
SODIUM SERPL-SCNC: 140 MMOL/L — SIGNIFICANT CHANGE UP (ref 135–145)
WBC # BLD: 7.52 K/UL — SIGNIFICANT CHANGE UP (ref 3.8–10.5)
WBC # FLD AUTO: 7.52 K/UL — SIGNIFICANT CHANGE UP (ref 3.8–10.5)

## 2025-07-26 PROCEDURE — 80053 COMPREHEN METABOLIC PANEL: CPT

## 2025-07-26 PROCEDURE — 93010 ELECTROCARDIOGRAM REPORT: CPT

## 2025-07-26 PROCEDURE — 71045 X-RAY EXAM CHEST 1 VIEW: CPT | Mod: 26

## 2025-07-26 PROCEDURE — 99284 EMERGENCY DEPT VISIT MOD MDM: CPT

## 2025-07-26 PROCEDURE — 93005 ELECTROCARDIOGRAM TRACING: CPT

## 2025-07-26 PROCEDURE — 99285 EMERGENCY DEPT VISIT HI MDM: CPT | Mod: 25

## 2025-07-26 PROCEDURE — 96374 THER/PROPH/DIAG INJ IV PUSH: CPT

## 2025-07-26 PROCEDURE — 85025 COMPLETE CBC W/AUTO DIFF WBC: CPT

## 2025-07-26 PROCEDURE — 71045 X-RAY EXAM CHEST 1 VIEW: CPT

## 2025-07-26 RX ADMIN — Medication 10 MILLIGRAM(S): at 04:44

## 2025-07-26 NOTE — ED ADULT TRIAGE NOTE - CHIEF COMPLAINT QUOTE
Patient BIBEMS from [facility] with complaints of HTN & hypoxia. As per EMS patients BP was systolic 200's & 80% on room air. Patient placed on 4L NC. Received hemodialysis today via R chest dialysis cath.

## 2025-07-26 NOTE — ED ADULT NURSE NOTE - CHIEF COMPLAINT QUOTE
Patient BIBEMS from [facility] with complaints of HTN & hypoxia. As per EMS patients BP was systolic 200's & 80% on room air. Patient placed on 4L NC. Received hemodialysis today via R chest dialysis cath.

## 2025-07-26 NOTE — ED PROVIDER NOTE - PATIENT PORTAL LINK FT
You can access the FollowMyHealth Patient Portal offered by [facility] by registering at the following website: [email or web address] By joining [facility]’s FollowMyHealth portal, you will also be able to view your health information using other applications (apps) compatible with our system.

## 2025-07-26 NOTE — ED ADULT NURSE NOTE - AS PAIN REST
0 (no pain/absence of nonverbal indicators of pain) Implemented All Universal Safety Interventions:  [place] to call system. Call bell, personal items and telephone within reach. Instruct patient to call for assistance. Room bathroom lighting operational. Non-slip footwear when patient is off stretcher. Physically safe environment: no spills, clutter or unnecessary equipment. Stretcher in lowest position, wheels locked, appropriate side rails in place.

## 2025-07-26 NOTE — ED PROVIDER NOTE - OBJECTIVE STATEMENT
89F presents to the ED for accelerated HTN. ESRD on HD, last session on Friday. Pt denies chest pain or headache. Per EMS, staff unable to get BP down. Unclear methods. Other PMH CHF, Anxiety, Depression, Hypothyroid. No vomiting. No abd pain. No other complaints.

## 2025-07-26 NOTE — ED PROVIDER NOTE - CLINICAL SUMMARY MEDICAL DECISION MAKING FREE TEXT BOX
89F presents to the ED for accelerated HTN. ESRD on HD, last session on Friday. Pt denies chest pain or headache. Per EMS, staff unable to get BP down. Unclear methods. Other PMH CHF, Anxiety, Depression, Hypothyroid. No vomiting. No abd pain. No other complaints.   Exam as stated. Labs EKG and Hydralazine given. Report of hypoxia however room air O2 sat 95-97%. BP improved to 168/64. Stable for discharge.

## 2025-07-26 NOTE — ED PROVIDER NOTE - NSFOLLOWUPINSTRUCTIONS_ED_ALL_ED_FT
Hypertension    Hydralazine 10mg IV x1 was provided. Blood pressure improved to 169/64. Oxygen 96% room air.     Hypertension, commonly called high blood pressure, is when the force of blood pumping through your arteries is too strong. Hypertension forces your heart to work harder to pump blood. Your arteries may become narrow or stiff. Having untreated or uncontrolled hypertension for a long period of time can cause heart attack, stroke, kidney disease, and other problems. If started on a medication, take exactly as prescribed by your health care professional. Maintain a healthy lifestyle and follow up with your primary care physician.    SEEK IMMEDIATE MEDICAL CARE IF YOU HAVE ANY OF THE FOLLOWING SYMPTOMS: severe headache, confusion, chest pain, abdominal pain, vomiting, or shortness of breath.

## 2025-07-26 NOTE — ED ADULT NURSE NOTE - OBJECTIVE STATEMENT
Patient BIBEMS from [facility] with complaints of HTN & hypoxia. As per EMS patients BP was systolic 200's & 80% on room air. Patient placed on 4L NC. Received hemodialysis today via R chest dialysis cath.

## 2025-07-26 NOTE — ED PROVIDER NOTE - PHYSICAL EXAMINATION
Vitals: I have reviewed the patients vital signs  General: nontoxic appearing  HEENT: Atraumatic, normocephalic, airway patent  Eyes: EOMI, tracking appropriately  Neck: no tracheal deviation  Chest/Lungs: no trauma, symmetric chest rise, speaking in complete sentences,  no resp distress, clear lungs b/l.   Heart: skin and extremities well perfused, regular rate and rhythm  Abd: soft NT ND  Neuro: A+O, speech clear.   Skin: no cyanosis, no jaundice